# Patient Record
Sex: MALE | URBAN - METROPOLITAN AREA
[De-identification: names, ages, dates, MRNs, and addresses within clinical notes are randomized per-mention and may not be internally consistent; named-entity substitution may affect disease eponyms.]

---

## 2023-08-11 ENCOUNTER — EMERGENCY (EMERGENCY)
Facility: HOSPITAL | Age: 63
LOS: 1 days | Discharge: HOME | End: 2023-08-11
Attending: EMERGENCY MEDICINE | Admitting: EMERGENCY MEDICINE
Payer: COMMERCIAL

## 2023-08-11 VITALS
OXYGEN SATURATION: 97 % | HEART RATE: 86 BPM | DIASTOLIC BLOOD PRESSURE: 88 MMHG | SYSTOLIC BLOOD PRESSURE: 156 MMHG | RESPIRATION RATE: 16 BRPM | TEMPERATURE: 99 F

## 2023-08-11 VITALS
RESPIRATION RATE: 16 BRPM | HEIGHT: 70 IN | TEMPERATURE: 99 F | OXYGEN SATURATION: 97 % | DIASTOLIC BLOOD PRESSURE: 105 MMHG | WEIGHT: 315 LBS | HEART RATE: 81 BPM | SYSTOLIC BLOOD PRESSURE: 169 MMHG

## 2023-08-11 PROCEDURE — 73700 CT LOWER EXTREMITY W/O DYE: CPT | Mod: 26,LT,MA

## 2023-08-11 PROCEDURE — 73562 X-RAY EXAM OF KNEE 3: CPT | Mod: 26,LT

## 2023-08-11 PROCEDURE — 99284 EMERGENCY DEPT VISIT MOD MDM: CPT | Mod: 25

## 2023-08-11 PROCEDURE — 76376 3D RENDER W/INTRP POSTPROCES: CPT | Mod: 26

## 2023-08-11 PROCEDURE — 99284 EMERGENCY DEPT VISIT MOD MDM: CPT

## 2023-08-11 PROCEDURE — 73700 CT LOWER EXTREMITY W/O DYE: CPT | Mod: MA

## 2023-08-11 PROCEDURE — 73562 X-RAY EXAM OF KNEE 3: CPT

## 2023-08-11 PROCEDURE — 76376 3D RENDER W/INTRP POSTPROCES: CPT

## 2023-08-11 RX ORDER — OXYCODONE AND ACETAMINOPHEN 5; 325 MG/1; MG/1
1 TABLET ORAL ONCE
Refills: 0 | Status: DISCONTINUED | OUTPATIENT
Start: 2023-08-11 | End: 2023-08-11

## 2023-08-11 RX ORDER — ACETAMINOPHEN 500 MG
650 TABLET ORAL ONCE
Refills: 0 | Status: COMPLETED | OUTPATIENT
Start: 2023-08-11 | End: 2023-08-11

## 2023-08-11 RX ADMIN — OXYCODONE AND ACETAMINOPHEN 1 TABLET(S): 5; 325 TABLET ORAL at 21:40

## 2023-08-11 RX ADMIN — OXYCODONE AND ACETAMINOPHEN 1 TABLET(S): 5; 325 TABLET ORAL at 21:22

## 2023-08-11 RX ADMIN — Medication 650 MILLIGRAM(S): at 21:21

## 2023-08-11 RX ADMIN — Medication 650 MILLIGRAM(S): at 21:40

## 2023-08-11 NOTE — ED ADULT TRIAGE NOTE - BSA (M2)
2.65 [Grade:  _____] : Grade: [unfilled] [Sexually Active] : not sexually active [Secondhand Smoke] : no exposure to  secondhand smoke

## 2023-08-11 NOTE — ED PROVIDER NOTE - ATTENDING APP SHARED VISIT CONTRIBUTION OF CARE
I have seen the patient with the BRITTON and agree with above examination and assessment and plan with the following addendum:        Focused PE:   General: NAD, alert and oriented  Head: Normocephalic, atraumatic  Eyes: PERRLA, EOMI  Cardiac: RRR, no murmurs, rubs or gallops  Resp: CTA, no wheezes, rales or ronchi  GI: Nondistended, nontender, no rebound or guarding  MSK: L. knee has difficulty moving, has swelling. Tender  Neuro: Alert and oriented, no focal deficits.   Ext: Non edematous, nontender.    Ddx: fracture/ tendon injury/ meniscus injury  Plan: xray, pain control, ct, reassess

## 2023-08-11 NOTE — ED ADULT NURSE NOTE - NSFALLHARMRISKINTERV_ED_ALL_ED
Assistance OOB with selected safe patient handling equipment if applicable/Assistance with ambulation/Communicate risk of Fall with Harm to all staff, patient, and family/Monitor gait and stability/Provide patient with walking aids/Provide visual cue: red socks, yellow wristband, yellow gown, etc/Reinforce activity limits and safety measures with patient and family/Bed in lowest position, wheels locked, appropriate side rails in place/Call bell, personal items and telephone in reach/Instruct patient to call for assistance before getting out of bed/chair/stretcher/Non-slip footwear applied when patient is off stretcher/[place] to call system/Physically safe environment - no spills, clutter or unnecessary equipment/Purposeful Proactive Rounding/Room/bathroom lighting operational, light cord in reach Assistance OOB with selected safe patient handling equipment if applicable/Assistance with ambulation/Communicate risk of Fall with Harm to all staff, patient, and family/Monitor gait and stability/Provide patient with walking aids/Provide visual cue: red socks, yellow wristband, yellow gown, etc/Reinforce activity limits and safety measures with patient and family/Bed in lowest position, wheels locked, appropriate side rails in place/Call bell, personal items and telephone in reach/Instruct patient to call for assistance before getting out of bed/chair/stretcher/Non-slip footwear applied when patient is off stretcher/[place] to call system/Physically safe environment - no spills, clutter or unnecessary equipment/Purposeful Proactive Rounding/Room/bathroom lighting operational, light cord in reach Assistance OOB with selected safe patient handling equipment if applicable/Assistance with ambulation/Communicate risk of Fall with Harm to all staff, patient, and family/Monitor gait and stability/Provide patient with walking aids/Provide visual cue: red socks, yellow wristband, yellow gown, etc/Reinforce activity limits and safety measures with patient and family/Bed in lowest position, wheels locked, appropriate side rails in place/Call bell, personal items and telephone in reach/Instruct patient to call for assistance before getting out of bed/chair/stretcher/Non-slip footwear applied when patient is off stretcher/[place] to call system/Physically safe environment - no spills, clutter or unnecessary equipment/Purposeful Proactive Rounding/Room/bathroom lighting operational, light cord in reach

## 2023-08-11 NOTE — ED PROVIDER NOTE - PATIENT PORTAL LINK FT
You can access the FollowMyHealth Patient Portal offered by [facility] by registering at the following website: [email or web address]. By joining [employer]’s FollowMyHealth portal, you will also be able to view your health information using other applications (apps) compatible with our system. You can access the FollowMyHealth Patient Portal offered by [facility] by registering at the following website: [email or web address]. By joining [employer]’s FollowMyHealth portal, you will also be able to view your health information using other applications (apps) compatible with our system. You can access the FollowMyHealth Patient Portal offered by [facility] by registering at the following website: [email or web address]. By joining [employer]’s FollowMyHealth portal, you will also be able to view your health information using other applications (apps) compatible with our system.

## 2023-08-11 NOTE — ED ADULT NURSE NOTE - IS THE PATIENT ABLE TO BE SCREENED?
Called pt and left voice message for pt to contact office for message for YRIS Hill.
Please review and assist.
Yes

## 2023-08-11 NOTE — ED PROVIDER NOTE - NSFOLLOWUPINSTRUCTIONS_ED_ALL_ED_FT
1) Follow-up with Orthopedics, See referred doctor. Call today/next business day for close, prompt follow-up.  2) Return to Emergency room for any worsening or persistent pain, weakness, numbness, fever, color change to extremity, or any other concerning symptoms.  3) Take ibuprofen 600 mg every  6 hours as needed.   4) You can consider discussing with your doctor if physical therapy or further imaging as an MRI may be beneficial.

## 2023-08-11 NOTE — ED PROVIDER NOTE - NS ED ATTENDING STATEMENT MOD
This was a shared visit with the BRITTON. I reviewed and verified the documentation and independently performed the documented:

## 2023-08-11 NOTE — ED ADULT NURSE REASSESSMENT NOTE - NS ED NURSE REASSESS COMMENT FT1
Knee immobilizer placed to pt LLE, pt instructed on how to use crutches and demonstrated proper usage.  Pt to dc home and follow up out pt with ortho.  Waiting for transportation.  Pt expressed understanding of dc instructions.

## 2023-08-11 NOTE — ED ADULT TRIAGE NOTE - NS ED NURSE AMBULANCES
no [facility] [place] [facility]

## 2023-08-11 NOTE — ED PROVIDER NOTE - OBJECTIVE STATEMENT
62-year-old male without reported past medical history presents today due to a trip and fall.  Patient reports that he landed on his left knee.  Patient reports he is not able to put weight onto the knee since the injury.  Patient describes the pain as aching, nonradiating, slightly improved since being in the ED, and currently 7 out of 10.  Patient denies head injury, numbness, weakness, laceration, prodromal symptoms, syncope, or any other complaints.

## 2023-08-11 NOTE — ED ADULT NURSE NOTE - OBJECTIVE STATEMENT
Pt presented to ED c/o left knee pain s/p falling on steps.  Pt states all his weight ended up on his left knee when he fell.  Denies any chest pain or SOB.  No n/v/d.  Some swelling and redness noted to left knee area.  Maintain comfort and safety.

## 2023-08-11 NOTE — ED PROVIDER NOTE - PROVIDER TOKENS
PROVIDER:[TOKEN:[[id number]],FOLLOWUP:[1-3 Days]] PROVIDER:[TOKEN:[[id number]],FOLLOWUP:[1-3 Days]] PROVIDER:[TOKEN:[[id number]],FOLLOWUP:[1-3 Days]]

## 2023-08-11 NOTE — ED PROVIDER NOTE - CARE PROVIDER_API CALL
Reid Morrison.  Orthopaedic Surgery  [address], [address]  [place] [address]  Phone: [phone]  Fax: [phone]  Follow Up Time: 1-3 Days   Reid Morrison.  Orthopaedic Surgery  [address], [address]  [place] [address]  Phone: [phone]  Fax: [phone]  Follow Up Time: 1-3 Days   Reid Morrison.  Orthopaedic Surgery  [address], [address]  [place] [address]  Phone: [phone]  Fax: [phone]  Follow Up Time: 1-3 Days

## 2023-08-11 NOTE — ED PROVIDER NOTE - PHYSICAL EXAMINATION
Constitutional: Awake, Alert, non-toxic. NAD. Well appearing, well nourished.   HEAD: Normocephalic, atraumatic.   EYES: EOM intact, conjunctiva and sclera are clear bilaterally.   ENT: No rhinorrhea, patent, mucous membranes pink/moist, no drooling or stridor.   NECK: Supple, non-tender  RESPIRATORY: Normal respiratory effort  EXTREMITIES: Full passive and active ROM in all extremities; (+) left lateral knee abrasion, (+) left quadricept tendon swelling and TTP, limited left knee extension, hips non-tender to palpation; distal pulses palpable and symmetric  SKIN: Warm, dry; good skin turgor, no apparent lesions or rashes, no ecchymosis, brisk capillary refill.  NEURO: A&O x3. Sensory and motor functions are grossly intact. Speech is normal. Appearance and judgement seem appropriate for gender and age.

## 2023-08-16 PROBLEM — Z00.00 ENCOUNTER FOR PREVENTIVE HEALTH EXAMINATION: Status: ACTIVE | Noted: 2023-08-16

## 2023-08-17 ENCOUNTER — APPOINTMENT (OUTPATIENT)
Dept: ORTHOPEDIC SURGERY | Facility: CLINIC | Age: 63
End: 2023-08-17
Payer: COMMERCIAL

## 2023-08-17 DIAGNOSIS — S89.92XA UNSPECIFIED INJURY OF LEFT LOWER LEG, INITIAL ENCOUNTER: ICD-10-CM
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
## 2023-08-17 DIAGNOSIS — S79.922A UNSPECIFIED INJURY OF LEFT THIGH, INITIAL ENCOUNTER: ICD-10-CM

## 2023-08-17 DIAGNOSIS — T14.8XXA OTHER INJURY OF UNSPECIFIED BODY REGION, INITIAL ENCOUNTER: ICD-10-CM

## 2023-08-17 DIAGNOSIS — M25.469 EFFUSION, UNSPECIFIED KNEE: ICD-10-CM

## 2023-08-17 PROCEDURE — 99204 OFFICE O/P NEW MOD 45 MIN: CPT

## 2023-08-17 NOTE — DATA REVIEWED
[FreeTextEntry1] : xrays and CT scans of the left [facility] I reviewed the study personally and agree with the following interpretation with the following exception- subpar visualization of the distal quad tendon):  xrays:  IMPRESSION: No radiographic osseous pathology. CT: Osseous: No acute displaced fracture or dislocation. 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 Mineralization within normal limits.  Soft tissues: Ill-defined, grossly lentiform shaped hyperattenuating prepatellar superficial subcutaneous hematoma measuring approximately 13.0 x 6.2 cm in maximal axial and longitudinal dimensions. Chronic grade 2 strain at the distal myotendinous junction of the vastus lateralis. Quadriceps tendinosis without rupture or gross high-grade tear. Tendinosis and mild laxity of the patellar tendon without gross tear. Of the Small-volume suprapatellar effusion. No distended Baker's popliteal cyst. Muscle bulk and attenuation otherwise grossly within normal limits.  IMPRESSION: 1. No acute displaced fracture of the left knee. Moderate volume prepatellar superficial subcutaneous hematoma. 2. Tendinosis and mild laxity of the extensor mechanism of the knee without rupture or gross high-grade tear. --- End of Report ---

## 2023-08-17 NOTE — DISCUSSION/SUMMARY
[de-identified] : The patient and their family member(s) were advised of the diagnosis. The natural history of the pathology was explained in full to the patient and the family in layman's terms.  quad strain-possible tear Burch Tanna vs Bursitis vs hematoma possible intrinsic knee injury that requires further examination Here is the plan that we have set forth today. 1. More ROM and movement when possible; ice and elevation- want to prevent stasis in the calf as well- exercises reviewed 2. aleve as needed but stick to two tabs in the morning, one at bedtime 3. MRI in the next 48hours to assist with soft tissue visualization in the distal quad but also the knee that is very difficult to examine today 4. return in 1 weeks to reaasess skin and swelling if any issues call sooner. The patient and the family understands the plan of care as described above.  All questions have been answered. Thank you for allowing me to care for [place]ED[place]. Sincerely, Kelly Doty, DO, FAAP, CAQ-[facility]

## 2023-08-17 NOTE — HISTORY OF PRESENT ILLNESS
[de-identified] : DOI: 8/11/23 Mechanism: was walking down a set of stairs.  RIght foot slipped first causing him to land with all his weight on the left leg. ended up on the ground but no hip pain or head trauma. Had to call 911 to be amulenced to the hospital- as he was unable to get up.  Taken to [facility]. He had immediate pain in the knee and thigh.  Swelling ensued and over the first few days developed quite a bit of ecchymosis. Taking Aleve GIven percocet for the first night. He is wearing a knee immobilizer and using a cane. 5'10" 350lbs Reports no medical issues. No metal in his body Has been able to put some weigh through the leg lately- now that he was reassured by imaging done at the hospital.  leg is down with gravity most of the day Struggling to get in or out of a car.  allergies; none  Review of Systems:  Constitutional:  no fever, fatigue or recent weight loss  HEENT: negative  CV: negative  Pulm: negative  GI: negative  : negative  Neuro: negative  Skin: negative  Endocrine: negative  Heme: negative  MSK: See HPI.

## 2023-08-17 NOTE — IMAGING
[de-identified] : Constitutional: Healthy, and well nourished in no acute distress.  Psych: Calm, cooperative, grossly normal  Eyes: Normal, sclera non-icteric  Ears, Nose, Mouth, Throat: External inspection of nose and ears does not reveal any scars or masses  Head: Normocephalic  Neck: Neck appears supple without sign of limited or painful ROM  Respiratory: Normal effort, no respiratory distress, no cyanosis  Cardiovascular: Visualized extremities without edema or varicosities, warm, brisk cap refill  Abdominal/GI: Not examined  Skin: No rashes on the extremity examined. Morbid obesity, walking with a cane and knee immobilizer on high. Neurological: Patient is awake and alert    due to patient discomfort- examination occurred with him seated in a chair. Some assessment standing Knee ROM	                	                  	  	 LEFT	               EXTENSION: Neutral (passively) - (unable to actively get to neutral) FLEXION: 90 degs seated.  Exam of the LEFT thigh/ Knee: Soft tissues: moderate ecchymosis  to the anterior and medial thigh- mid to distal 1/3rd.  There is also a well localized collect pre to suprapatellar- appears minimally warm- no spreading erythema but appears localized- possible Marilyn Tanna vs traumatic bursitis vs hematoma. skin overlying the collection is mobile - not taut and no chaz fluctuance. swelling above and intra-articular Tenderness: globally minimal tenderness to the anterior and peripatellar region plus some diffuse tenderness to the thigh- namely distal 1/3rd No calf tenderness on exam Negative susanna's Can plantarflexiona dn dorsiflex well Normal sensation to leg.  Special tests:limited exam abiliies todayu  Knee stability:  unable to assess today in the chair 	 Patella: unable to palpate paellar difinitively  due to large effusion and body habitus   WB with cane but can stand and distribute weight. Can contract quad briefly isometrically Can flex hip and flex knee up to about 40 degrees at the hip modified log roll into IR/ER nontender

## 2023-08-18 ENCOUNTER — APPOINTMENT (OUTPATIENT)
Dept: MRI IMAGING | Facility: CLINIC | Age: 63
End: 2023-08-18
Payer: COMMERCIAL

## 2023-08-18 PROCEDURE — 73721 MRI JNT OF LWR EXTRE W/O DYE: CPT | Mod: LT

## 2023-08-21 ENCOUNTER — APPOINTMENT (OUTPATIENT)
Dept: ORTHOPEDIC SURGERY | Facility: CLINIC | Age: 63
End: 2023-08-21
Payer: COMMERCIAL

## 2023-08-21 PROCEDURE — 99204 OFFICE O/P NEW MOD 45 MIN: CPT | Mod: 57

## 2023-08-21 NOTE — IMAGING
[de-identified] : The patient is a well appearing 62 year year old male of their stated age. Patient ambulates with a antalagic gait. Negative straight leg raise bilateral  General: in no acute distress, seated comfortably, moving easily. Obesity  Skin: No discoloration, rashes; on palpation skin is dry,  Neuro: Normal sensation all dermatomes, motor all myotomes Vascular: Normal pulses, no edema, normal temperature Coordination and balance: Normal Psych: normal mood and affect, non pressured speech, alert and oriented x3  Effected Knee:                         	 ROM:  3-140 degrees  Lachman: Negative Pivot Shift: Negative Anterior Drawer: Negative Posterior Drawer / Sag: Negative Varus Stress 0 degrees: Stable Varus Stress 30 degrees: Stable Valgus Stress 0 degrees: Stable Valgus Stress 30 degrees: Stable Medial Angel: Negative Lateral Angel: Negative Patella Glide: 2+ Patella Apprehension: Negative Patella Grind: Negative  Palpation: Medial Joint Line: Nontender Lateral Joint Line: Nontender Medial Collateral Ligament: Nontender Lateral Collateral Ligament/PLC: Nontender Distal Femur: Nontender Proximal Tibia: Nontender Tibial Tubercle: Nontender Distal Pole Patella: Nontender Quadriceps Tendon: tender &  not Intact Patella Tendon: Nontender &  Intact Medial Distal Hamstring/PES: Nontender Lateral Distal Hamstring: Nontender & Stable Iliotibial Band: Nontender Medial Patellofemoral Ligament: Nontender Adductor: Nontender Proximal GSC-Plantaris: Nontender Calf: Supple & Nontender  Inspection: Deformity: yes Erythema: No Ecchymosis: No Abrasions: No Effusion: yes Prepatellar Bursitis: No  Neurologic Exam: Sensation L4-S1: Grossly Intact  Motor Exam: Quadriceps: 5 out of 5 Hamstrings: 5 out of 5 EHL: 5 out of 5 FHL: 5 out of 5 TA: 5 out of 5 GS: 5 out of 5  Circulatory/Pulses: Dorsalis Pedis: 2+ Posterior Tibialis: 2+  Additional Pertinent Findings: None  Contralateral Knee:                           	 ROM: 0-145 degrees  Other Pertinent Findings: None

## 2023-08-23 ENCOUNTER — OUTPATIENT (OUTPATIENT)
Dept: OUTPATIENT SERVICES | Facility: HOSPITAL | Age: 63
LOS: 1 days | End: 2023-08-23
Payer: COMMERCIAL

## 2023-08-23 VITALS
SYSTOLIC BLOOD PRESSURE: 133 MMHG | HEART RATE: 75 BPM | HEIGHT: 69.5 IN | WEIGHT: 315 LBS | OXYGEN SATURATION: 96 % | TEMPERATURE: 97 F | DIASTOLIC BLOOD PRESSURE: 87 MMHG | RESPIRATION RATE: 15 BRPM

## 2023-08-23 DIAGNOSIS — Z01.818 ENCOUNTER FOR OTHER PREPROCEDURAL EXAMINATION: ICD-10-CM

## 2023-08-23 DIAGNOSIS — E66.01 MORBID (SEVERE) OBESITY DUE TO EXCESS CALORIES: ICD-10-CM

## 2023-08-23 DIAGNOSIS — S76.112A STRAIN OF LEFT QUADRICEPS MUSCLE, FASCIA AND TENDON, INITIAL ENCOUNTER: ICD-10-CM

## 2023-08-23 DIAGNOSIS — G47.30 SLEEP APNEA, UNSPECIFIED: ICD-10-CM

## 2023-08-23 LAB
A1C WITH ESTIMATED AVERAGE GLUCOSE RESULT: 5.2 % — SIGNIFICANT CHANGE UP (ref 4–5.6)
ALBUMIN SERPL ELPH-MCNC: 3.8 G/DL — SIGNIFICANT CHANGE UP (ref 3.3–5)
ALP SERPL-CCNC: 58 U/L — SIGNIFICANT CHANGE UP (ref 30–120)
ALT FLD-CCNC: 55 U/L — SIGNIFICANT CHANGE UP (ref 10–60)
ANION GAP SERPL CALC-SCNC: 7 MMOL/L — SIGNIFICANT CHANGE UP (ref 5–17)
AST SERPL-CCNC: 32 U/L — SIGNIFICANT CHANGE UP (ref 10–40)
BILIRUB SERPL-MCNC: 0.9 MG/DL — SIGNIFICANT CHANGE UP (ref 0.2–1.2)
BUN SERPL-MCNC: 21 MG/DL — SIGNIFICANT CHANGE UP (ref 7–23)
CALCIUM SERPL-MCNC: 8.9 MG/DL — SIGNIFICANT CHANGE UP (ref 8.4–10.5)
CHLORIDE SERPL-SCNC: 102 MMOL/L — SIGNIFICANT CHANGE UP (ref 96–108)
CO2 SERPL-SCNC: 28 MMOL/L — SIGNIFICANT CHANGE UP (ref 22–31)
CREAT SERPL-MCNC: 0.91 MG/DL — SIGNIFICANT CHANGE UP (ref 0.5–1.3)
EGFR: 96 ML/MIN/1.73M2 — SIGNIFICANT CHANGE UP
ESTIMATED AVERAGE GLUCOSE: 103 MG/DL — SIGNIFICANT CHANGE UP (ref 68–114)
GLUCOSE SERPL-MCNC: 108 MG/DL — HIGH (ref 70–99)
HCT VFR BLD CALC: 45.6 % — SIGNIFICANT CHANGE UP (ref 39–50)
HGB BLD-MCNC: 14.9 G/DL — SIGNIFICANT CHANGE UP (ref 13–17)
MCHC RBC-ENTMCNC: 29.9 PG — SIGNIFICANT CHANGE UP (ref 27–34)
MCHC RBC-ENTMCNC: 32.7 GM/DL — SIGNIFICANT CHANGE UP (ref 32–36)
MCV RBC AUTO: 91.4 FL — SIGNIFICANT CHANGE UP (ref 80–100)
NRBC # BLD: 0 /100 WBCS — SIGNIFICANT CHANGE UP (ref 0–0)
PLATELET # BLD AUTO: 236 K/UL — SIGNIFICANT CHANGE UP (ref 150–400)
POTASSIUM SERPL-MCNC: 4.7 MMOL/L — SIGNIFICANT CHANGE UP (ref 3.5–5.3)
POTASSIUM SERPL-SCNC: 4.7 MMOL/L — SIGNIFICANT CHANGE UP (ref 3.5–5.3)
PROT SERPL-MCNC: 7.5 G/DL — SIGNIFICANT CHANGE UP (ref 6–8.3)
RBC # BLD: 4.99 M/UL — SIGNIFICANT CHANGE UP (ref 4.2–5.8)
RBC # FLD: 12.5 % — SIGNIFICANT CHANGE UP (ref 10.3–14.5)
SODIUM SERPL-SCNC: 137 MMOL/L — SIGNIFICANT CHANGE UP (ref 135–145)
WBC # BLD: 6.44 K/UL — SIGNIFICANT CHANGE UP (ref 3.8–10.5)
WBC # FLD AUTO: 6.44 K/UL — SIGNIFICANT CHANGE UP (ref 3.8–10.5)

## 2023-08-23 PROCEDURE — G0463: CPT

## 2023-08-23 PROCEDURE — 93005 ELECTROCARDIOGRAM TRACING: CPT

## 2023-08-23 PROCEDURE — 85027 COMPLETE CBC AUTOMATED: CPT

## 2023-08-23 PROCEDURE — 93010 ELECTROCARDIOGRAM REPORT: CPT

## 2023-08-23 PROCEDURE — 80053 COMPREHEN METABOLIC PANEL: CPT

## 2023-08-23 PROCEDURE — 36415 COLL VENOUS BLD VENIPUNCTURE: CPT

## 2023-08-23 PROCEDURE — 83036 HEMOGLOBIN GLYCOSYLATED A1C: CPT

## 2023-08-23 NOTE — H&P PST ADULT - ASSESSMENT
this is a 63 y/o male who is scheduled for left quadriceps tendon repair on 8/29/23 this is a 61 y/o male who is scheduled for left quadriceps tendon repair on 8/29/23

## 2023-08-23 NOTE — H&P PST ADULT - NSICDXFAMILYHX_GEN_ALL_CORE_FT
FAMILY HISTORY:  Father  Still living? No  FH: arrhythmia, Age at diagnosis: Age Unknown    Mother  Still living? Yes, Estimated age:   FHx: hypertension, Age at diagnosis: Age Unknown

## 2023-08-23 NOTE — H&P PST ADULT - NSCAFFEAMTFREQ_GEN_ALL_CORE_SD
I have personally evaluated and examined the patient. The Attending was available to me as a supervising provider if needed. 5-6 cups/cans per day

## 2023-08-23 NOTE — H&P PST ADULT - HISTORY OF PRESENT ILLNESS
this is  a 62 yr old male who fell 2 weeks ago and tore left quadriceps tendon; has had alot of left knee pain and swelling since then; wearing a leg brace; to have left quadriceps tendon repair

## 2023-08-24 ENCOUNTER — APPOINTMENT (OUTPATIENT)
Dept: ORTHOPEDIC SURGERY | Facility: CLINIC | Age: 63
End: 2023-08-24

## 2023-08-25 NOTE — DISCUSSION/SUMMARY
[de-identified] : Assessment:   The patient presents with history, examination and imaging that are most consistent with a diagnosis of:  Left quadriceps rupture.    We discussed further treatment options, including continuing conservative measures versus surgical intervention. The patient feels they have exhausted conservative treatment and is seeking a definitive treatment option. The risks, benefits and alternatives of the proposed procedure were discussed, along with the expected post-operative recovery and potential complications. The patient agrees to participate in post-operative physical therapy. The patient verbalized understanding and wishes to proceed with: LEFT QUADRICEPS TENDON REPAIR   Prior to appointment and during encounter with patient extensive medical records were reviewed including but not limited to, hospital records, out patient records, imaging results, and lab data. During this appointment the patient was examined, diagnoses were discussed and explained in a face to face manner. In addition extensive time was spent reviewing aforementioned diagnostic studies. Counseling including abnormal image results, differential diagnoses, treatment options, risk and benefits, lifestyle changes, current condition, and current medications was performed. Patient's comments, questions, and concerns were addressed with spouse present, and patient verbalized understanding.   Plan:   Counseling:     - Patient recommended to modify their activities until symptoms resolve.     - Resume use of cane and knee brace for stability.   Surgery:     - Surgical case request placed for left quadriceps repair.    - Planned procedure as specified in assessment above.     - Patient to schedule at their earliest convenience.     Follow-up:  post operatively   - Patient will require preop medical clearance - will schedule surgery asap  Consent:  Conservative treatment, nontreatment, nonsurgical intervention and surgical intervention treatment options have been reviewed with the patient.  The patient continues to be symptomatic and has failed conservative treatment, and elects to move forward with surgical intervention.  The patient is indicated for [the above procedure] and all indicated procedures. As such the alternatives, benefits and risks, of the above procedure, including but not limited to bleeding, infection, neurovascular injury, loss of limb, loss of life,  DVT, PE, RSD, inability to return to previous level of activity, inability to return to previous level of employment, advancement of or to osteoarthritic changes, joint instability or motion loss, hardware failure or migration, malunion or nonunion, failure to resolve all symptoms, failure to return to sports and need for further procedures were discussed with the patient and/or their legal guardian who agreed to move forward with surgical intervention.  They have reviewed and signed the consent form today after expressing understanding of the above documented conversation. The patient or their representative will contact my office as instructed on the preoperative instruction sheet they received today to schedule surgery in a timely manner as discussed.

## 2023-08-25 NOTE — HISTORY OF PRESENT ILLNESS
[de-identified] : The patient is a 62 year old M who presents today complaining of left leg pain. Date of Injury/Onset:  8/11/23 Pain:    At Rest: 1/10  With Activity:    7/10  Mechanism of injury: Slipped down the stairs and felt a strain. Quality of symptoms: Aching Improves with: Aleve Worse with: Walking / Bending Prior treatment/ Imaging: [facility] date of injury/ Followed up with Dr. Doty/ MRI  Occupation:   Additional Information: [None]   The patient is a 62-year-old who presents today complaining of left knee pain after slip and fall downstairs on 8/11/2023. He was transported to [facility] and evalauated with xrays, CT scan and bracing.

## 2023-08-25 NOTE — DATA REVIEWED
[FreeTextEntry1] : [facility] MRI Left knee 2/18/2023 Left quadriceps rupture. .   CT scan left knee NW IMPRESSION: No radiographic osseous pathology. CT: Osseous: No acute displaced fracture or dislocation. 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 Mineralization within normal limits.  Soft tissues: Ill-defined, grossly lentiform shaped hyperattenuating prepatellar superficial subcutaneous hematoma measuring approximately 13.0 x 6.2 cm in maximal axial and longitudinal dimensions. Chronic grade 2 strain at the distal myotendinous junction of the vastus lateralis. Quadriceps tendinosis without rupture or gross high-grade tear. Tendinosis and mild laxity of the patellar tendon without gross tear. Of the Small-volume suprapatellar effusion. No distended Baker's popliteal cyst. Muscle bulk and attenuation otherwise grossly within normal limits.  IMPRESSION: 1. No acute displaced fracture of the left knee. Moderate volume prepatellar superficial subcutaneous hematoma. 2. Tendinosis and mild laxity of the extensor mechanism of the knee without rupture or gross high-grade tear. --- End of Report ---.

## 2023-08-25 NOTE — PHYSICAL EXAM
[Normal Coordination] : normal coordination [Normal DTR UE/LE] : normal DTR UE/LE  [Normal Sensation] : normal sensation [Normal Mood and Affect] : normal mood and affect [Orientated] : orientated [Normal Skin] : normal skin [No Rash] : no rash [No Ulcers] : no ulcers [No Lesions] : no lesions [No obvious lymphadenopathy in areas examined] : no obvious lymphadenopathy in areas examined [] : ambulation with cane [Left] : left knee [There are no fractures, subluxations or dislocations. No significant abnormalities are seen] : There are no fractures, subluxations or dislocations. No significant abnormalities are seen [Degenerative change] : Degenerative change [Mild patellofemoral OA] : Mild patellofemoral OA [FreeTextEntry9] : PATELLA BAJA

## 2023-08-28 ENCOUNTER — TRANSCRIPTION ENCOUNTER (OUTPATIENT)
Age: 63
End: 2023-08-28

## 2023-08-28 RX ORDER — CEFAZOLIN SODIUM 1 G
2000 VIAL (EA) INJECTION ONCE
Refills: 0 | Status: DISCONTINUED | OUTPATIENT
Start: 2023-08-29 | End: 2023-08-29

## 2023-08-28 RX ORDER — CHLORHEXIDINE GLUCONATE 213 G/1000ML
1 SOLUTION TOPICAL DAILY
Refills: 0 | Status: DISCONTINUED | OUTPATIENT
Start: 2023-08-29 | End: 2023-09-02

## 2023-08-28 RX ORDER — APREPITANT 80 MG/1
40 CAPSULE ORAL ONCE
Refills: 0 | Status: COMPLETED | OUTPATIENT
Start: 2023-08-29 | End: 2023-08-29

## 2023-08-29 ENCOUNTER — APPOINTMENT (OUTPATIENT)
Dept: ORTHOPEDIC SURGERY | Facility: HOSPITAL | Age: 63
End: 2023-08-29
Payer: COMMERCIAL

## 2023-08-29 ENCOUNTER — TRANSCRIPTION ENCOUNTER (OUTPATIENT)
Age: 63
End: 2023-08-29

## 2023-08-29 ENCOUNTER — INPATIENT (INPATIENT)
Facility: HOSPITAL | Age: 63
LOS: 3 days | Discharge: NURSING, REHAB OR LONG-TERM CARE | DRG: 488 | End: 2023-09-02
Attending: STUDENT IN AN ORGANIZED HEALTH CARE EDUCATION/TRAINING PROGRAM | Admitting: STUDENT IN AN ORGANIZED HEALTH CARE EDUCATION/TRAINING PROGRAM
Payer: COMMERCIAL

## 2023-08-29 VITALS
HEIGHT: 70 IN | OXYGEN SATURATION: 97 % | TEMPERATURE: 97 F | SYSTOLIC BLOOD PRESSURE: 142 MMHG | RESPIRATION RATE: 18 BRPM | HEART RATE: 73 BPM | DIASTOLIC BLOOD PRESSURE: 91 MMHG | WEIGHT: 315 LBS

## 2023-08-29 DIAGNOSIS — S76.112A STRAIN OF LEFT QUADRICEPS MUSCLE, FASCIA AND TENDON, INITIAL ENCOUNTER: ICD-10-CM

## 2023-08-29 PROCEDURE — 27385 REPAIR OF THIGH MUSCLE: CPT | Mod: LT

## 2023-08-29 PROCEDURE — 27405 REPAIR OF KNEE LIGAMENT: CPT | Mod: 59,LT

## 2023-08-29 PROCEDURE — 29345 APPLICATION OF LONG LEG CAST: CPT | Mod: 59,LT

## 2023-08-29 DEVICE — SUT ANCHOR FIBERTAK TRIPLE LOAD TAPE BL/W BLK/W W: Type: IMPLANTABLE DEVICE | Status: FUNCTIONAL

## 2023-08-29 RX ORDER — HYDROMORPHONE HYDROCHLORIDE 2 MG/ML
0.5 INJECTION INTRAMUSCULAR; INTRAVENOUS; SUBCUTANEOUS
Refills: 0 | Status: DISCONTINUED | OUTPATIENT
Start: 2023-08-29 | End: 2023-08-29

## 2023-08-29 RX ORDER — ACETAMINOPHEN 500 MG
1000 TABLET ORAL ONCE
Refills: 0 | Status: COMPLETED | OUTPATIENT
Start: 2023-08-29 | End: 2023-08-29

## 2023-08-29 RX ORDER — CEFAZOLIN SODIUM 1 G
2000 VIAL (EA) INJECTION EVERY 8 HOURS
Refills: 0 | Status: COMPLETED | OUTPATIENT
Start: 2023-08-29 | End: 2023-08-30

## 2023-08-29 RX ORDER — ACETAMINOPHEN 500 MG
1000 TABLET ORAL EVERY 8 HOURS
Refills: 0 | Status: DISCONTINUED | OUTPATIENT
Start: 2023-08-30 | End: 2023-09-02

## 2023-08-29 RX ORDER — MAGNESIUM HYDROXIDE 400 MG/1
30 TABLET, CHEWABLE ORAL DAILY
Refills: 0 | Status: DISCONTINUED | OUTPATIENT
Start: 2023-08-29 | End: 2023-09-02

## 2023-08-29 RX ORDER — HYDROMORPHONE HYDROCHLORIDE 2 MG/ML
0.5 INJECTION INTRAMUSCULAR; INTRAVENOUS; SUBCUTANEOUS
Refills: 0 | Status: DISCONTINUED | OUTPATIENT
Start: 2023-08-29 | End: 2023-09-02

## 2023-08-29 RX ORDER — SODIUM CHLORIDE 9 MG/ML
1000 INJECTION, SOLUTION INTRAVENOUS
Refills: 0 | Status: DISCONTINUED | OUTPATIENT
Start: 2023-08-29 | End: 2023-08-29

## 2023-08-29 RX ORDER — ONDANSETRON 8 MG/1
4 TABLET, FILM COATED ORAL ONCE
Refills: 0 | Status: DISCONTINUED | OUTPATIENT
Start: 2023-08-29 | End: 2023-08-29

## 2023-08-29 RX ORDER — SODIUM CHLORIDE 9 MG/ML
1000 INJECTION, SOLUTION INTRAVENOUS
Refills: 0 | Status: DISCONTINUED | OUTPATIENT
Start: 2023-08-29 | End: 2023-09-02

## 2023-08-29 RX ORDER — HYDROMORPHONE HYDROCHLORIDE 2 MG/ML
1 INJECTION INTRAMUSCULAR; INTRAVENOUS; SUBCUTANEOUS
Refills: 0 | Status: DISCONTINUED | OUTPATIENT
Start: 2023-08-29 | End: 2023-08-29

## 2023-08-29 RX ORDER — OXYCODONE HYDROCHLORIDE 5 MG/1
5 TABLET ORAL EVERY 4 HOURS
Refills: 0 | Status: DISCONTINUED | OUTPATIENT
Start: 2023-08-29 | End: 2023-09-02

## 2023-08-29 RX ORDER — OXYCODONE HYDROCHLORIDE 5 MG/1
10 TABLET ORAL EVERY 4 HOURS
Refills: 0 | Status: DISCONTINUED | OUTPATIENT
Start: 2023-08-29 | End: 2023-09-02

## 2023-08-29 RX ORDER — ONDANSETRON 8 MG/1
4 TABLET, FILM COATED ORAL EVERY 6 HOURS
Refills: 0 | Status: DISCONTINUED | OUTPATIENT
Start: 2023-08-29 | End: 2023-09-02

## 2023-08-29 RX ADMIN — Medication 100 MILLIGRAM(S): at 22:20

## 2023-08-29 RX ADMIN — SODIUM CHLORIDE 125 MILLILITER(S): 9 INJECTION, SOLUTION INTRAVENOUS at 22:22

## 2023-08-29 RX ADMIN — Medication 1000 MILLIGRAM(S): at 22:44

## 2023-08-29 RX ADMIN — APREPITANT 40 MILLIGRAM(S): 80 CAPSULE ORAL at 13:00

## 2023-08-29 RX ADMIN — CHLORHEXIDINE GLUCONATE 1 APPLICATION(S): 213 SOLUTION TOPICAL at 13:00

## 2023-08-29 RX ADMIN — Medication 400 MILLIGRAM(S): at 22:19

## 2023-08-29 RX ADMIN — SODIUM CHLORIDE 75 MILLILITER(S): 9 INJECTION, SOLUTION INTRAVENOUS at 18:25

## 2023-08-29 NOTE — ASU PATIENT PROFILE, ADULT - FALL HARM RISK - RISK INTERVENTIONS
Communicate Fall Risk and Risk Factors to all staff, patient, and family/Reinforce activity limits and safety measures with patient and family/Visual Cue: Yellow wristband/Bed in lowest position, wheels locked, appropriate side rails in place/Call bell, personal items and telephone in reach/Instruct patient to call for assistance before getting out of bed or chair/Non-slip footwear when patient is out of bed/[place] to call system/Physically safe environment - no spills, clutter or unnecessary equipment/Purposeful Proactive Rounding/Room/bathroom lighting operational, light cord in reach Communicate Fall Risk and Risk Factors to all staff, patient, and family/Reinforce activity limits and safety measures with patient and family/Visual Cue: Yellow wristband/Bed in lowest position, wheels locked, appropriate side rails in place/Call bell, personal items and telephone in reach/Instruct patient to call for assistance before getting out of bed or chair/Non-slip footwear when patient is out of bed/[place] to call system/Physically safe environment - no spills, clutter or unnecessary equipment/Purposeful Proactive Rounding/Room/bathroom lighting operational, light cord in reach Communicate Fall Risk and Risk Factors to all staff, patient, and family/Reinforce activity limits and safety measures with patient and family/Visual Cue: Yellow wristband/Bed in lowest position, wheels locked, appropriate side rails in place/Call bell, personal items and telephone in reach/Instruct patient to call for assistance before getting out of bed or chair/Non-slip footwear when patient is out of bed/[place] to call system/Physically safe environment - no spills, clutter or unnecessary equipment/Purposeful Proactive Rounding/Room/bathroom lighting operational, light cord in reach

## 2023-08-29 NOTE — ASU PATIENT PROFILE, ADULT - PROVIDER NOTIFICATION
Erythromycin Counseling:  I discussed with the patient the risks of erythromycin including but not limited to GI upset, allergic reaction, drug rash, diarrhea, increase in liver enzymes, and yeast infections. Declines

## 2023-08-29 NOTE — BRIEF OPERATIVE NOTE - NSICDXBRIEFPOSTOP_GEN_ALL_CORE_FT
POST-OP DIAGNOSIS:  Rupture of left quadriceps muscle, initial encounter 29-Aug-2023 16:20:04  Jacky English

## 2023-08-30 ENCOUNTER — TRANSCRIPTION ENCOUNTER (OUTPATIENT)
Age: 63
End: 2023-08-30

## 2023-08-30 LAB
ANION GAP SERPL CALC-SCNC: 7 MMOL/L — SIGNIFICANT CHANGE UP (ref 5–17)
BUN SERPL-MCNC: 16 MG/DL — SIGNIFICANT CHANGE UP (ref 7–23)
CALCIUM SERPL-MCNC: 9 MG/DL — SIGNIFICANT CHANGE UP (ref 8.4–10.5)
CHLORIDE SERPL-SCNC: 101 MMOL/L — SIGNIFICANT CHANGE UP (ref 96–108)
CO2 SERPL-SCNC: 28 MMOL/L — SIGNIFICANT CHANGE UP (ref 22–31)
CREAT SERPL-MCNC: 0.92 MG/DL — SIGNIFICANT CHANGE UP (ref 0.5–1.3)
EGFR: 95 ML/MIN/1.73M2 — SIGNIFICANT CHANGE UP
GLUCOSE SERPL-MCNC: 133 MG/DL — HIGH (ref 70–99)
HCT VFR BLD CALC: 40.3 % — SIGNIFICANT CHANGE UP (ref 39–50)
HGB BLD-MCNC: 13.4 G/DL — SIGNIFICANT CHANGE UP (ref 13–17)
MCHC RBC-ENTMCNC: 30.6 PG — SIGNIFICANT CHANGE UP (ref 27–34)
MCHC RBC-ENTMCNC: 33.3 GM/DL — SIGNIFICANT CHANGE UP (ref 32–36)
MCV RBC AUTO: 92 FL — SIGNIFICANT CHANGE UP (ref 80–100)
NRBC # BLD: 0 /100 WBCS — SIGNIFICANT CHANGE UP (ref 0–0)
PLATELET # BLD AUTO: 201 K/UL — SIGNIFICANT CHANGE UP (ref 150–400)
POTASSIUM SERPL-MCNC: 4.1 MMOL/L — SIGNIFICANT CHANGE UP (ref 3.5–5.3)
POTASSIUM SERPL-SCNC: 4.1 MMOL/L — SIGNIFICANT CHANGE UP (ref 3.5–5.3)
RBC # BLD: 4.38 M/UL — SIGNIFICANT CHANGE UP (ref 4.2–5.8)
RBC # FLD: 12.8 % — SIGNIFICANT CHANGE UP (ref 10.3–14.5)
SODIUM SERPL-SCNC: 136 MMOL/L — SIGNIFICANT CHANGE UP (ref 135–145)
WBC # BLD: 9.06 K/UL — SIGNIFICANT CHANGE UP (ref 3.8–10.5)
WBC # FLD AUTO: 9.06 K/UL — SIGNIFICANT CHANGE UP (ref 3.8–10.5)

## 2023-08-30 PROCEDURE — 99222 1ST HOSP IP/OBS MODERATE 55: CPT

## 2023-08-30 RX ORDER — OXYCODONE HYDROCHLORIDE 5 MG/1
1 TABLET ORAL
Qty: 0 | Refills: 0 | DISCHARGE
Start: 2023-08-30

## 2023-08-30 RX ORDER — ASPIRIN/CALCIUM CARB/MAGNESIUM 324 MG
1 TABLET ORAL
Qty: 0 | Refills: 0 | DISCHARGE
Start: 2023-08-30

## 2023-08-30 RX ORDER — ACETAMINOPHEN 500 MG
2 TABLET ORAL
Qty: 0 | Refills: 0 | DISCHARGE
Start: 2023-08-30

## 2023-08-30 RX ORDER — ASPIRIN/CALCIUM CARB/MAGNESIUM 324 MG
81 TABLET ORAL
Refills: 0 | Status: DISCONTINUED | OUTPATIENT
Start: 2023-08-30 | End: 2023-09-02

## 2023-08-30 RX ORDER — DIPHENHYDRAMINE HCL 50 MG
50 CAPSULE ORAL AT BEDTIME
Refills: 0 | Status: DISCONTINUED | OUTPATIENT
Start: 2023-08-30 | End: 2023-09-02

## 2023-08-30 RX ORDER — MAGNESIUM HYDROXIDE 400 MG/1
30 TABLET, CHEWABLE ORAL
Qty: 0 | Refills: 0 | DISCHARGE
Start: 2023-08-30

## 2023-08-30 RX ADMIN — Medication 50 MILLIGRAM(S): at 22:57

## 2023-08-30 RX ADMIN — Medication 1000 MILLIGRAM(S): at 14:37

## 2023-08-30 RX ADMIN — Medication 1000 MILLIGRAM(S): at 06:16

## 2023-08-30 RX ADMIN — Medication 1000 MILLIGRAM(S): at 06:18

## 2023-08-30 RX ADMIN — Medication 1000 MILLIGRAM(S): at 21:30

## 2023-08-30 RX ADMIN — Medication 1000 MILLIGRAM(S): at 21:35

## 2023-08-30 RX ADMIN — Medication 100 MILLIGRAM(S): at 06:15

## 2023-08-30 RX ADMIN — OXYCODONE HYDROCHLORIDE 5 MILLIGRAM(S): 5 TABLET ORAL at 23:38

## 2023-08-30 RX ADMIN — OXYCODONE HYDROCHLORIDE 5 MILLIGRAM(S): 5 TABLET ORAL at 23:00

## 2023-08-30 RX ADMIN — Medication 81 MILLIGRAM(S): at 17:27

## 2023-08-30 RX ADMIN — Medication 81 MILLIGRAM(S): at 06:16

## 2023-08-30 NOTE — DISCHARGE NOTE PROVIDER - NSDCCPTREATMENT_GEN_ALL_CORE_FT
PRINCIPAL PROCEDURE  Procedure: Repair of left quadriceps  Findings and Treatment: left quadriceps tendon rupture

## 2023-08-30 NOTE — PHYSICAL THERAPY INITIAL EVALUATION ADULT - IMPAIRMENTS CONTRIBUTING TO GAIT DEVIATIONS, PT EVAL
56 y/o F w/ PMHX of Depression, Anemia, HTN, HX TIA, HX ILR presenting w/ headache, lower extremity weakness and difficulty speech.  Symptoms resolved, blood pressure was uncontrolled on admission but better controlled.  Had neuro workup done on last admission, negative.  Cleared from medicine for discharge.
impaired balance/decreased strength

## 2023-08-30 NOTE — DISCHARGE NOTE PROVIDER - NSDCFUADDINST_GEN_ALL_CORE_FT
- Call your doctor if you experience:  • An increase in pain not controlled by pain medication or change in activity or  position.  • Temperature greater than 101° F.  • Redness, increased swelling or foul smelling drainage from or around the  incision.  • Numbness, tingling or a change in color or temperature of the operative leg.  • Call your doctor immediately if you experience chest pain, shortness of breath or calf pain.  Pain medicine has been prescribed for you, as needed, and it often causes constipation.  Take docusate sodium (Colace) 100mg 3x daily, while taking narcotic pain medication.   For Constipation :   • Increase your water intake. Drink at least 8 glasses of water daily.  • Try adding fiber to your diet by eating fruits, vegetables and foods that are rich in grains.  • If you do experience constipation, you may take an over-the-counter laxative such as, Senokot, Miralax or  Milk of Magnesia.   - Call your doctor if you experience:  • An increase in pain not controlled by pain medication or change in activity or  position.  • Temperature greater than 101° F.  • Numbness, tingling or a change in color or temperature of the operative lower extremity.  • Call your doctor immediately if you experience chest pain, shortness of breath or calf pain.  Pain medicine has been prescribed for you, as needed, and it often causes constipation.  Take docusate sodium (Colace) 100mg 3x daily, while taking narcotic pain medication.   For Constipation :   • Increase your water intake. Drink at least 8 glasses of water daily.  • Try adding fiber to your diet by eating fruits, vegetables and foods that are rich in grains.  • If you do experience constipation, you may take an over-the-counter laxative such as, Senokot, Miralax or  Milk of Magnesia.

## 2023-08-30 NOTE — DISCHARGE NOTE PROVIDER - NSDCFUSCHEDAPPT_GEN_ALL_CORE_FT
Napoleon Dunbar Physician [facility]  [address] [place] [address]  Scheduled Appointment: 09/14/2023     Napoleon Dunbar Physician [facility]  [address] [place] [address]  Scheduled Appointment: 09/14/2023     Napoleon Dunbar Physician [facility]  [place] [place] [address]  Scheduled Appointment: 09/14/2023

## 2023-08-30 NOTE — CARE COORDINATION ASSESSMENT. - NSPASTMEDSURGHISTORY_GEN_ALL_CORE_FT
PAST MEDICAL & SURGICAL HISTORY:  History of obstructive sleep apnea      Severe obesity (BMI >= 40)      No significant past surgical history

## 2023-08-30 NOTE — OCCUPATIONAL THERAPY INITIAL EVALUATION ADULT - ADDITIONAL COMMENTS
Pt lives in a private home [address] no railing 5 & 7 steps inside the home +stall shower  Pt owns crutches, [employer]

## 2023-08-30 NOTE — DISCHARGE NOTE PROVIDER - PROVIDER TOKENS
PROVIDER:[TOKEN:[[id number]],SCHEDULEDAPPT:[09/14/2023],SCHEDULEDAPPTTIME:[02:15 PM]] PROVIDER:[TOKEN:[[id number]],SCHEDULEDAPPT:[09/14/2023],SCHEDULEDAPPTTIME:[02:15 PM]] PROVIDER:[TOKEN:[[id number]],SCHEDULEDAPPT:[09/14/2023],SCHEDULEDAPPTTIME:[02:15 PM]]

## 2023-08-30 NOTE — DISCHARGE NOTE PROVIDER - NSDCCAREPROVSEEN_GEN_ALL_CORE_FT
Sanket, Calderon Barrera, Susana Liu, Kenia English, Napoleon Xie Sanket, Calderon Barrera, Susana Liu, Kenia English, Jacky Dunbar, Napoleon La, Domo Sanket, Calderon Liu, Kenia English, Jacky Dunbar, Napoleon La, Domo Sanket, Calderon Liu, Kenia English, Jacky Dunbar, Napoleon La, Jose Roberto Linares Sankte, Calderon Liu, Kenia English, Jacky Dunbar, Napoleon La, Jose Roberto Linares

## 2023-08-30 NOTE — PROGRESS NOTE ADULT - SUBJECTIVE AND OBJECTIVE BOX
Post Op Note- POD #1    SUBJECTIVE     61 yo Male status post repair of left quadriceps tendon.  Patient is alert and comfortable.  Pain is well controlled.  Denies chest pain/shortness of breath/nausea/vomiting.     OBJECTIVE    T(C): 36.4 (08-30-23 @ 08:15), Max: 37.1 (08-29-23 @ 21:45)  HR: 59 (08-30-23 @ 08:15) (59 - 92)  BP: 135/80 (08-30-23 @ 08:15) (110/94 - 155/92)  RR: 18 (08-30-23 @ 08:15) (13 - 20)  SpO2: 99% (08-30-23 @ 08:15) (94% - 99%)  Wt(kg): --      08-29 @ 07:01  -  08-30 @ 07:00  --------------------------------------------------------  IN: 0 mL / OUT: 3200 mL / NET: -3200 mL      PHYSICAL EXAM    Left leg cylinder cast C/D/I  Sensation Intact to Light Touch distally  EHL/FHL intact  Toes warm and pink, capillary refill <2 sec.   2+ DP pulses bilaterally      ASSESSMENT AND PLAN    - Orthopedically stable  - DVT prophylaxis: PAS + Aspirin 81 mg Q12h  - OOB as tolerated with PT/OT  - Pain control as clinically indicated  - Medicine to follow   - Continue antibiotics as per SCIP protocol  - Follow up Labs  - Incentive spirometry  - Discharge plan [facility], pending bed availability Post Op Note- POD #1    SUBJECTIVE     61 yo Male status post repair of left quadriceps tendon.  Patient is alert and comfortable.  Pain is well controlled.  Denies chest pain/shortness of breath/nausea/vomiting.     OBJECTIVE    T(C): 36.4 (08-30-23 @ 08:15), Max: 37.1 (08-29-23 @ 21:45)  HR: 59 (08-30-23 @ 08:15) (59 - 92)  BP: 135/80 (08-30-23 @ 08:15) (110/94 - 155/92)  RR: 18 (08-30-23 @ 08:15) (13 - 20)  SpO2: 99% (08-30-23 @ 08:15) (94% - 99%)  Wt(kg): --      08-29 @ 07:01  -  08-30 @ 07:00  --------------------------------------------------------  IN: 0 mL / OUT: 3200 mL / NET: -3200 mL      PHYSICAL EXAM    Left leg cylinder cast C/D/I  Sensation Intact to Light Touch distally  EHL/FHL intact  Toes warm and pink, capillary refill <2 sec.   2+ DP pulses bilaterally      ASSESSMENT AND PLAN    - Orthopedically stable  - DVT prophylaxis: PAS + Aspirin 81 mg Q12h  - OOB as tolerated with PT/OT  - Pain control as clinically indicated  - Medicine to follow   - Continue antibiotics as per SCIP protocol  - Follow up Labs  - Incentive spirometry  - Discharge plan [facility], pending bed availability Post Op Note- POD #1    SUBJECTIVE     63 yo Male status post repair of left quadriceps tendon.  Patient is alert and comfortable.  Pain is well controlled.  Denies chest pain/shortness of breath/nausea/vomiting.     OBJECTIVE    T(C): 36.4 (08-30-23 @ 08:15), Max: 37.1 (08-29-23 @ 21:45)  HR: 59 (08-30-23 @ 08:15) (59 - 92)  BP: 135/80 (08-30-23 @ 08:15) (110/94 - 155/92)  RR: 18 (08-30-23 @ 08:15) (13 - 20)  SpO2: 99% (08-30-23 @ 08:15) (94% - 99%)  Wt(kg): --      08-29 @ 07:01  -  08-30 @ 07:00  --------------------------------------------------------  IN: 0 mL / OUT: 3200 mL / NET: -3200 mL      PHYSICAL EXAM    Left leg cylinder cast C/D/I  Sensation Intact to Light Touch distally  EHL/FHL intact  Toes warm and pink, capillary refill <2 sec.   2+ DP pulses bilaterally      ASSESSMENT AND PLAN    - Orthopedically stable  - DVT prophylaxis: PAS + Aspirin 81 mg Q12h  - OOB as tolerated with PT/OT  - Pain control as clinically indicated  - Medicine to follow   - Continue antibiotics as per SCIP protocol  - Follow up Labs  - Incentive spirometry  - Discharge plan [facility], pending bed availability

## 2023-08-30 NOTE — SOCIAL WORK PROGRESS NOTE - NSSWPROGRESSNOTE_GEN_ALL_CORE
met with patient and sister Abi [phone] bedside. He gave permission to speak with her as well, he requested referrals to [facility] no bed, [facility] [place] out of network and [facility] also no beds. I updated them and he wanted [facility] which can offer bed. [facility] cannot offer bed and [facility] was out of network. He accepted bed at [facility]. His family can bring c pap if [facility] cannot order one. Once they provide NPI and information for precert, I will contact insurance to setup precertification with insurance.   met with patient and sister Abi [phone] bedside. He gave permission to speak with her as well, he requested referrals to [facility] no bed, [facility] out of network and [facility] also no beds. I updated them and he wanted [facility] which can offer bed. [facility] cannot offer bed and [facility] was out of network. He accepted bed at [facility]. His family can bring c pap if [facility] cannot order one. Once they provide NPI and information for precert, I will contact insurance to setup precertification with insurance.   met with patient and sister Abi [phone] bedside. He gave permission to speak with her as well, he requested referrals to [facility] no bed, [facility] out of network and [facility] also no beds. I updated them and he wanted [facility] which can offer bed. [facility] cannot offer bed and [facility] was out of network. He accepted bed at [facility]. His family can bring c pap if [facility] cannot order one. Once they provide NPI and information for precert, I will contact insurance to setup precertification with insurance.   met with patient and sister Abi [phone] bedside. He gave permission to speak with her as well, he requested referrals to [facility] no bed, [facility] out of network and [facility] also no beds. I updated them and he wanted [facility] which can offer bed. [facility] cannot offer bed and [facility] was out of network. He accepted bed at [facility]. His family can bring c pap if [facility] cannot order one. I called insurance [phone] spoke with Michaela who setup case and provided pending reference [id number] and told me to submit POA to fax [phone]. I sent POA and will setup ambulette for Thursday once we secure approval from insurance. Patient's family can bring cpap for [facility] if needed   met with patient and sister Abi [phone] bedside. He gave permission to speak with her as well, he requested referrals to [facility] no bed, [facility] out of network and [facility] also no beds. I updated them and he wanted [facility] which can offer bed. [facility] cannot offer bed and [facility] was out of network. He accepted bed at [facility]. His family can bring c pap if [facility] cannot order one. I called insurance [phone] spoke with Michaela who setup case and provided pending reference [id number] and told me to submit POA to fax [phone]. I sent POA and will setup ambulette for Thursday once we secure approval from insurance. Patient's family can bring cpap for [facility] if needed   met with patient and sister Abi [phone] bedside. He gave permission to speak with her as well, he requested referrals to [facility] no bed, [facility] out of network and [facility] also no beds. I updated them and he wanted [facility] which can offer bed. [facility] cannot offer bed and [facility] was out of network. He accepted bed at [facility]. His family can bring c pap if [facility] cannot order one. I called insurance [phone] spoke with Michaela who setup case and provided pending reference [id number] and told me to submit POA to fax [phone]. I sent POA and will setup ambulette for Thursday once we secure approval from insurance. Patient's family can bring cpap for [facility] if needed

## 2023-08-30 NOTE — CARE COORDINATION ASSESSMENT. - OTHER PERTINENT DISCHARGE PLANNING INFORMATION:
met with this 62 year old male admitted from home for left quad repair. SW received consult for TEA which I marked as completed. Patient resides in a private home with his mother split level. His sister Abi [phone] is involved but she lives in [place]. Patient fell 2 weeks ago and anticipated having surgery and needing rehab. He said he contacted insurance and selected [employer], [employer] and [employer]. I explained process and educated him on ambulette transport to rehab. I requested [facility] and spoke with physical therapy about consult from MD for rehab. Plan for TEA pending bed availability, insurance approval and medically optimized. Patient had no pain complaints and seems to be coping well with admission.   met with this 62 year old male admitted from home for left quad repair. SW received consult for TEA which I marked as completed. Patient resides in a private home with his mother split level. His sister Abi [phone] is involved but she lives in [place]. Patient fell 2 weeks ago and anticipated having surgery and needing rehab. He said he contacted insurance and selected [facility], [employer] and [facility]. I explained process and educated him on ambulette transport to rehab. I requested CAROLA and spoke with physical therapy about consult from MD for rehab. Plan for TEA pending bed availability, insurance approval and medically optimized. Patient had no pain complaints and seems to be coping well with admission.   met with this 62 year old male admitted from home for left quad repair. SW received consult for TEA which I marked as completed. Patient resides in a private home with his mother split level. His sister Abi [phone] is involved but she lives in [place]. Patient fell 2 weeks ago and anticipated having surgery and needing rehab. He said he contacted insurance and selected [employer], [facility] and [email or web address]. I explained process and educated him on ambulette transport to rehab. I requested CAROL[facility] and spoke with physical therapy about consult from MD for rehab. Plan for TEA pending bed availability, insurance approval and medically optimized. Patient had no pain complaints and seems to be coping well with admission.

## 2023-08-30 NOTE — CONSULT NOTE ADULT - ASSESSMENT
63 yo male, pmh of obesity, jere, presenting for left quadriceps repair  - dvt ppx, pain control as per ortho  - pt/ot  - likely needs rehab  - continue cpap at night  - will start benadryl prn for sleep  - no medical contraindication to DC when rehab is arranged

## 2023-08-30 NOTE — CARE COORDINATION ASSESSMENT. - SOCIAL WORK INTERVENTIONS
[place], [place] per patient request/coordination of care/referral to [facility] [place], [place] per patient request/coordination of care/referral to [facility] [place], [place] per patient request/coordination of care/referral to Sub-Acute Rehabilitation Facility

## 2023-08-30 NOTE — CARE COORDINATION ASSESSMENT. - NSCAREPROVIDERS_GEN_ALL_CORE_FT
CARE PROVIDERS:  Administration: Skye Sweet  Administration: Jorge Alberto Mendoza  Administration: Jae Barker  Admitting: Napoleon Dunbar  Attending: Napoleon Dunbar  Nurse: Estephanie Ortiz  Nurse: Aislinn Godoy  Nurse: Mateo Weber  Nurse: Sergei Osorio  Override: Aislinn Godoy  Override: Percy Galeana  PCA/Nursing Assistant: Argelia Bryant  PCA/Nursing Assistant: Marianna Dumont  Physical Therapy: Silvio Cheung  Physical Therapy: Owen Estrada  Primary Team: Calderon Coles  Primary Team: Jacky English  Primary Team: Susana Barrera  Primary Team: Kenia Liu  Primary Team: Domo La  Respiratory Therapy: Alisah Patel  : Telma Mccoy// Supp. Assoc.: Ellen Taylor   CARE PROVIDERS:  Administration: Skye Sweet  Administration: Jorge Alberto Mendoza  Administration: Jae Barker  Admitting: Napoleon Dunbar  Attending: Napoleon Dunbar  Nurse: Estephanie Ortiz  Nurse: Aislinn Godoy  Nurse: Mateo Weber  Nurse: Sergei Osorio  Override: Aislinn Godoy  Override: Percy Galeana  PCA/Nursing Assistant: Argelia Bryant  PCA/Nursing Assistant: Marianna Dumont  Physical Therapy: Silvio Cheung  Physical Therapy: Owen Estrada  Primary Team: Calderon Coles  Primary Team: Jacky English  Primary Team: Susana Barrera  Primary Team: Kenia Liu  Primary Team: Domo La  Respiratory Therapy: Alisha Patel  : Telma Mccoy// Supp. Assoc.: Ellen Taylor

## 2023-08-30 NOTE — PROGRESS NOTE ADULT - NSPROGADDITIONALINFOA_GEN_ALL_CORE
I performed a history and physical exam of the patient and discussed their management with the advanced care provider. I reviewed the advanced care provider's note and agree with the documented findings and plan of care. My medical decision making and objective findings are found above.        Napoleon Dunbar MD

## 2023-08-30 NOTE — PHYSICAL THERAPY INITIAL EVALUATION ADULT - RANGE OF MOTION EXAMINATION, REHAB EVAL
left LE n/t due to long leg cast/bilateral upper extremity ROM was WFL (within functional limits)/Right LE ROM was WFL (within functional limits)

## 2023-08-30 NOTE — DISCHARGE NOTE PROVIDER - HOSPITAL COURSE
This patient was admitted to [facility] with a left quadriceps tendon rupture, after sustaining a fall two weeks ago.  Patient underwent Pre-Surgical Testing and was medically cleared to undergo left quadriceps tendon repair by Dr. Dunbar. Procedure was well tolerated.  No operative or sujey-operative complications arose during patient's hospital course.  Patient received antibiotic according to SCIP guidelines for infection prevention. Aspirin 81 mg Q12h was given for DVT prophylaxis, in addition to the use of SCDs.  Anesthesia, Medical Hospitalist, Physical Therapy and Occupational Therapy were consulted. Patient is stable for discharge with a good prognosis.  Appropriate discharge instructions and medications are provided in this document.   This patient was admitted to [facility] with a left quadriceps tendon rupture, after sustaining a fall two weeks ago.  Patient underwent Pre-Surgical Testing and was medically cleared to undergo left quadriceps tendon repair by Dr. Dunbar. Procedure was well tolerated.  No operative or sujey-operative complications arose during patient's hospital course.  Patient received antibiotic according to SCIP guidelines for infection prevention. Aspirin 81 mg Q12h was given for DVT prophylaxis, in addition to the use of SCDs.  Anesthesia, Medical Hospitalist, Physical Therapy and Occupational Therapy were consulted. Patient is stable for discharge with a good prognosis.  Appropriate discharge instructions and medications are provided in this document.   This patient was admitted to [facility] with a left quadriceps tendon rupture, after sustaining a fall two weeks ago.  Patient underwent Pre-Surgical Testing and was medically cleared to undergo left quadriceps tendon repair by Dr. Dunbar. Procedure was well tolerated.  No operative or sujey-operative complications arose during patient's hospital course.  Patient received antibiotic according to SCIP guidelines for infection prevention. Aspirin 81 mg Q12h was given for DVT prophylaxis, in addition to the use of SCDs.  Anesthesia, Medical Hospitalist, Physical Therapy and Occupational Therapy were consulted. Patient is stable for discharge with a good prognosis.  Appropriate discharge instructions and medications are provided in this document.

## 2023-08-30 NOTE — CARE COORDINATION ASSESSMENT. - LIVES WITH, PROFILE
resides with mother, patient reported she can manage at  home alone, sister was here yesterday she lives in [place]/other relative

## 2023-08-30 NOTE — CONSULT NOTE ADULT - SUBJECTIVE AND OBJECTIVE BOX
HPI:  63 yo male, h/o obesity, jere, admitted for quadriceps repair on left, denies current pain, dizziness, nausea, vomiting, fever, chills. Wants something for sleep.    PAST MEDICAL & SURGICAL HISTORY:  Severe obesity (BMI >= 40)      History of obstructive sleep apnea      No significant past surgical history          ANTIMICROBIAL:    CARDIOVASCULAR:    PULMONARY:    NEUROLOGIC:  acetaminophen     Tablet .. 1000 milliGRAM(s) Oral every 8 hours  diphenhydrAMINE 50 milliGRAM(s) Oral at bedtime PRN  HYDROmorphone  Injectable 0.5 milliGRAM(s) IV Push every 3 hours PRN  ondansetron Injectable 4 milliGRAM(s) IV Push every 6 hours PRN  oxyCODONE    IR 10 milliGRAM(s) Oral every 4 hours PRN  oxyCODONE    IR 5 milliGRAM(s) Oral every 4 hours PRN    ONCOLOGIC:    HEMATOLOGIC:  aspirin enteric coated 81 milliGRAM(s) Oral two times a day    GATROINTESTINAL:  magnesium hydroxide Suspension 30 milliLiter(s) Oral daily PRN     MEDS:    ENDO/METABOLIC:    IV FLUID/NUTRITION:  lactated ringers. 1000 milliLiter(s) IV Continuous <Continuous>    TOPICAL:  chlorhexidine 2% Cloths 1 Application(s) Topical daily    IMMUNOLOGIC & OTHER        Allergies    No Known Allergies    Intolerances      PAST MEDICAL & SURGICAL HISTORY:  Severe obesity (BMI >= 40)      History of obstructive sleep apnea      No significant past surgical history        SOCIAL HISTORY:    FAMILY HISTORY:  FH: arrhythmia (Father)    FHx: hypertension (Mother)        Vital Signs Last 24 Hrs  T(C): 36.4 (30 Aug 2023 08:15), Max: 37.1 (29 Aug 2023 21:45)  T(F): 97.5 (30 Aug 2023 08:15), Max: 98.7 (29 Aug 2023 21:45)  HR: 59 (30 Aug 2023 08:15) (59 - 92)  BP: 135/80 (30 Aug 2023 08:15) (110/94 - 155/92)  BP(mean): --  RR: 18 (30 Aug 2023 08:15) (13 - 20)  SpO2: 99% (30 Aug 2023 08:15) (94% - 99%)    Parameters below as of 30 Aug 2023 08:15  Patient On (Oxygen Delivery Method): room air          I&O's Detail    29 Aug 2023 07:01  -  30 Aug 2023 07:00  --------------------------------------------------------  IN:  Total IN: 0 mL    OUT:    Voided (mL): 3200 mL  Total OUT: 3200 mL    Total NET: -3200 mL        Daily Height in cm: 177.8 (29 Aug 2023 13:00)    Daily     REVIEW OF SYSTEMS:    as per hpi, other systems reviewed and are negative    PHYSICAL EXAM:    GENERAL: NAD, older male, obese  HEAD:  Atraumatic, Normocephalic  EYES: EOMI, PERRLA, conjunctiva and sclera clear  ENMT: No tonsillar erythema Moist mucous membranes   NECK: Supple, No JVD noted  NERVOUS SYSTEM:  Alert & Oriented X3, Good concentration; no focal deficits noted  CHEST/LUNG: Clear to percussion bilaterally; No rales, rhonchi, wheezing, or rubs  HEART: Regular rate and rhythm; No murmurs, rubs, or gallops  ABDOMEN: Soft, Nontender, BS +  EXTREMITIES: no clubbing, cyanosis, left leg in full cast to mid thigh  LYMPH: No lymphadenopathy   SKIN: No rashes or lesions      LABS:                        13.4   9.06  )-----------( 201      ( 30 Aug 2023 06:30 )             40.3     08-30    136  |  101  |  16  ----------------------------<  133<H>  4.1   |  28  |  0.92    Ca    9.0      30 Aug 2023 06:30        Urinalysis Basic - ( 30 Aug 2023 06:30 )    Color: x / Appearance: x / SG: x / pH: x  Gluc: 133 mg/dL / Ketone: x  / Bili: x / Urobili: x   Blood: x / Protein: x / Nitrite: x   Leuk Esterase: x / RBC: x / WBC x   Sq Epi: x / Non Sq Epi: x / Bacteria: x              RADIOLOGY & ADDITIONAL STUDIES:

## 2023-08-30 NOTE — PATIENT CHOICE NOTE. - NSPTCHOICESTATE_GEN_ALL_CORE
I have met with the patient and/or caregiver to discuss discharge goals and treatment plan. Patient and/or caregiver also provided with instructions on accessing the CMS Compare websites for additional information related to Post Acute Provider quality and resource use measures to assist them in evaluation of the providers and in selecting their post-acute provider of choice. Patient and caregiver were informed of the facilities that are owned and/or operated by [facility]. I have discussed with the patient the availability of in-network facilities and providers. Patient and caregiver provided with a list of post-acute providers whose services are appropriate to the discharge plans and patient needs.     For patient requiring durable medical equipment, patient and/or caregiver were informed that they have the right to request who provides the required equipment. I have met with the patient and/or caregiver to discuss discharge goals and treatment plan. Patient and/or caregiver also provided with instructions on accessing the CMS Compare websites for additional information related to Post Acute Provider quality and resource use measures to assist them in evaluation of the providers and in selecting their post-acute provider of choice. Patient and caregiver were informed of the facilities that are owned and/or operated by [facility]. I have discussed with the patient the availability of in-network facilities and providers. Patient and caregiver provided with a list of post-acute providers whose services are appropriate to the discharge plans and patient needs.     For patient requiring durable medical equipment, patient and/or caregiver were informed that they have the right to request who provides the required equipment. I have met with the patient and/or caregiver to discuss discharge goals and treatment plan. Patient and/or caregiver also provided with instructions on accessing the CMS Compare websites for additional information related to Post Acute Provider quality and resource use measures to assist them in evaluation of the providers and in selecting their post-acute provider of choice. Patient and caregiver were informed of the facilities that are owned and/or operated by [facility]. I have discussed with the patient the availability of in-network facilities and providers. Patient and caregiver provided with a list of post-acute providers whose services are appropriate to the discharge plans and patient needs.     For patient requiring durable medical equipment, patient and/or caregiver were informed that they have the right to request who provides the required equipment.

## 2023-08-30 NOTE — PHYSICAL THERAPY INITIAL EVALUATION ADULT - ADDITIONAL COMMENTS
Lives in house with mother.  1 LILIANA without railing; 5 stairs to main level and 7 to bedroom with HRs.  Owns quad cane, crutches.  Has tub.

## 2023-08-30 NOTE — DISCHARGE NOTE PROVIDER - CARE PROVIDER_API CALL
Napoleon Dunbar  Orthopaedic Surgery  [address] [place] [address]  Phone: [phone]  Fax: [phone]  Scheduled Appointment: 09/14/2023 02:15 PM   Napoleon Dunbar  Orthopaedic Surgery  [address] [place] [address]  Phone: [phone]  Fax: [phone]  Scheduled Appointment: 09/14/2023 02:15 PM   Napoleon Dunbar  Orthopaedic Surgery  [address] [place] [address]  Phone: [phone]  Fax: [phone]  Scheduled Appointment: 09/14/2023 02:15 PM

## 2023-08-30 NOTE — DISCHARGE NOTE PROVIDER - NSDCCPCAREPLAN_GEN_ALL_CORE_FT
PRINCIPAL DISCHARGE DIAGNOSIS  Diagnosis: Rupture of left quadriceps tendon  Assessment and Plan of Treatment: Your left leg is in a cylinder cast. Keep the cast dry, clean and intact until seen in office...cover with plastic when showering to keep dry!  You may bear weight on the left leg, as tolerated.

## 2023-08-31 PROCEDURE — 99232 SBSQ HOSP IP/OBS MODERATE 35: CPT

## 2023-08-31 RX ADMIN — Medication 1000 MILLIGRAM(S): at 05:01

## 2023-08-31 RX ADMIN — Medication 1000 MILLIGRAM(S): at 21:12

## 2023-08-31 RX ADMIN — Medication 81 MILLIGRAM(S): at 18:03

## 2023-08-31 RX ADMIN — OXYCODONE HYDROCHLORIDE 5 MILLIGRAM(S): 5 TABLET ORAL at 23:02

## 2023-08-31 RX ADMIN — OXYCODONE HYDROCHLORIDE 5 MILLIGRAM(S): 5 TABLET ORAL at 05:43

## 2023-08-31 RX ADMIN — Medication 1000 MILLIGRAM(S): at 13:38

## 2023-08-31 RX ADMIN — Medication 81 MILLIGRAM(S): at 05:01

## 2023-08-31 RX ADMIN — OXYCODONE HYDROCHLORIDE 5 MILLIGRAM(S): 5 TABLET ORAL at 23:48

## 2023-08-31 RX ADMIN — Medication 1000 MILLIGRAM(S): at 13:40

## 2023-08-31 RX ADMIN — Medication 1000 MILLIGRAM(S): at 21:23

## 2023-08-31 RX ADMIN — OXYCODONE HYDROCHLORIDE 5 MILLIGRAM(S): 5 TABLET ORAL at 05:01

## 2023-08-31 RX ADMIN — OXYCODONE HYDROCHLORIDE 5 MILLIGRAM(S): 5 TABLET ORAL at 14:12

## 2023-08-31 RX ADMIN — Medication 1000 MILLIGRAM(S): at 05:04

## 2023-08-31 NOTE — PROGRESS NOTE ADULT - SUBJECTIVE AND OBJECTIVE BOX
Patient is a 62y old  Male who presents with a chief complaint of left quadriceps tendon repair (30 Aug 2023 11:07)      INTERVAL HPI/OVERNIGHT EVENTS:  Patient seen and examined, having some pain in leg at knee, improved with medication, denies fever, chills, dizziness.     ROS reviewed and is otherwise negative        Vital Signs Last 24 Hrs  T(C): 36.4 (31 Aug 2023 09:31), Max: 36.9 (30 Aug 2023 15:00)  T(F): 97.6 (31 Aug 2023 09:31), Max: 98.5 (31 Aug 2023 00:27)  HR: 69 (31 Aug 2023 09:31) (56 - 78)  BP: 107/69 (31 Aug 2023 09:31) (107/69 - 151/86)  BP(mean): --  RR: 17 (31 Aug 2023 09:31) (16 - 17)  SpO2: 99% (31 Aug 2023 09:31) (95% - 99%)        PHYSICAL EXAM:  GENERAL: NAD, older male, obese  HEAD:  Atraumatic, Normocephalic  EYES: EOMI, PERRLA, conjunctiva and sclera clear  ENMT: No tonsillar erythema Moist mucous membranes   NECK: Supple, No JVD noted  NERVOUS SYSTEM:  Alert & Oriented X3, Good concentration; no focal deficits noted  CHEST/LUNG: Clear to percussion bilaterally; No rales, rhonchi, wheezing, or rubs  HEART: Regular rate and rhythm; No murmurs, rubs, or gallops  ABDOMEN: Soft, Nontender, BS +  EXTREMITIES: no clubbing, cyanosis, left leg in full cast to mid thigh  LYMPH: No lymphadenopathy   SKIN: No rashes or lesions    MEDICATIONS  (STANDING):  acetaminophen     Tablet .. 1000 milliGRAM(s) Oral every 8 hours  aspirin enteric coated 81 milliGRAM(s) Oral two times a day  chlorhexidine 2% Cloths 1 Application(s) Topical daily  lactated ringers. 1000 milliLiter(s) (125 mL/Hr) IV Continuous <Continuous>    MEDICATIONS  (PRN):  diphenhydrAMINE 50 milliGRAM(s) Oral at bedtime PRN Insomnia  HYDROmorphone  Injectable 0.5 milliGRAM(s) IV Push every 3 hours PRN breakthrough pain  magnesium hydroxide Suspension 30 milliLiter(s) Oral daily PRN Constipation  ondansetron Injectable 4 milliGRAM(s) IV Push every 6 hours PRN Nausea and/or Vomiting  oxyCODONE    IR 10 milliGRAM(s) Oral every 4 hours PRN Severe Pain (7 - 10)  oxyCODONE    IR 5 milliGRAM(s) Oral every 4 hours PRN Moderate Pain (4 - 6)      Allergies    No Known Allergies    Intolerances          LABS:      Ca    9.0        30 Aug 2023 06:30        Urinalysis Basic - ( 30 Aug 2023 06:30 )    Color: x / Appearance: x / SG: x / pH: x  Gluc: 133 mg/dL / Ketone: x  / Bili: x / Urobili: x   Blood: x / Protein: x / Nitrite: x   Leuk Esterase: x / RBC: x / WBC x   Sq Epi: x / Non Sq Epi: x / Bacteria: x      CAPILLARY BLOOD GLUCOSE          RADIOLOGY & ADDITIONAL TESTS:        Care Discussed with Consultants/Other Providers:    Advanced Directives: [ ] DNR  [ ] No feeding tube  [ ] MOLST in chart  [ ] MOLST completed today  [ ] Unknown

## 2023-08-31 NOTE — SOCIAL WORK PROGRESS NOTE - NSSWPROGRESSNOTE_GEN_ALL_CORE
received call from patient that he prefers [facility]. I did refer him there. We received voicemail from insurance. CM from insurance Bere [phone]. Request for subacute rehab denied after being reviewed by medical director with reference #[id number]. The message said that there was no skilled need and patient could have needs met with home care. Peer to peer [phone] about criteria and rationale for decision. Not needed to start appeal. If need to appeal in denial letter we will have number or for expedited appeal [phone].  will update team and follow for recommendations for home or for a peer to peer. SW will update patient as well with determination.   received call from patient that he prefers [facility]. I did refer him there. We received voicemail from insurance. CM from insurance Bere [phone]. Request for subacute rehab denied after being reviewed by medical director with reference #[id number]. The message said that there was no skilled need and patient could have needs met with home care. Peer to peer [phone] about criteria and rationale for decision. Not needed to start appeal. If need to appeal in denial letter we will have number or for expedited appeal [phone].  will update team and follow for recommendations for home or for a peer to peer. SW will update patient as well with determination.   received call from patient that he prefers [facility]. I did refer him there. We received voicemail from insurance. CM from insurance Bere [phone]. Request for subacute rehab denied after being reviewed by medical director with reference #[id number]. The message said that there was no skilled need and patient could have needs met with home care. Peer to peer [phone] about criteria and rationale for decision. Not needed to start appeal. If need to appeal in denial letter we will have number or for expedited appeal [phone].  will update team and follow for recommendations for home or for a peer to peer. SW will update patient as well with determination.

## 2023-08-31 NOTE — PROGRESS NOTE ADULT - ASSESSMENT
63 yo male, pmh of obesity, jere, presenting for left quadriceps repair  - dvt ppx, pain control as per ortho  - pt/ot  -  needs rehab given full leg cast preventing ambulation up stairs and in confined space  - continue cpap at night  -  benadryl prn for sleep  - no medical contraindication to DC when rehab is arranged

## 2023-08-31 NOTE — PROGRESS NOTE ADULT - SUBJECTIVE AND OBJECTIVE BOX
ORTHOPEDIC PA PROGRESS NOTE  SOLEDAD KELLER      62y Male                                                                                                                               POD #    2    STATUS POST:               Pre-Op Dx: Rupture of left quadriceps muscle      Post-Op Dx:  Rupture of left quadriceps muscle, initial encounter      Procedure: Repair of left quadriceps                                                  Pain (0-10):   Current Pain Management:  [ ] PCA   [ ] Po Analgesics [ X] IM /IV [employer]     T(F): 98.5  HR: 56  BP: 151/86  RR: 16  SpO2: 98%                        13.4   9.06  )-----------( 201      ( 30 Aug 2023 06:30 )             40.3                     08-30    136  |  101  |  16  ----------------------------<  133<H>  4.1   |  28  |  0.92    Ca    9.0      30 Aug 2023 06:30      Physical Exam :    -   Cast intact   -   Wound C/D/I.   -   Distal Neurvascular status intact grossly.   -   Warm well perfused; capillary refill <3 seconds   -   (+)EHL/FHL 5/5  -   (+) Sensation to light touch      A/P: 62y Male s/p Rupture of left quadriceps muscle, initial encounter      -   Ortho Stable  -   Pain control   -   Medicine to follow  -   DVT ppx:     [ ]SCDs     [X ] ASA     [ ] Eliquis     [ ] Lovenox  -   Weight bearing status:  WBAT [X ]        PWB    [ ]     TTWB  [ ]      NWB  [ ]   -  Dispo:     Home [ ]     Acute Rehab [ ]     TEA [X ]     TBD [ ]

## 2023-09-01 PROCEDURE — 99232 SBSQ HOSP IP/OBS MODERATE 35: CPT

## 2023-09-01 RX ADMIN — OXYCODONE HYDROCHLORIDE 5 MILLIGRAM(S): 5 TABLET ORAL at 11:21

## 2023-09-01 RX ADMIN — Medication 1000 MILLIGRAM(S): at 13:15

## 2023-09-01 RX ADMIN — Medication 1000 MILLIGRAM(S): at 14:23

## 2023-09-01 RX ADMIN — Medication 1000 MILLIGRAM(S): at 05:20

## 2023-09-01 RX ADMIN — Medication 1000 MILLIGRAM(S): at 21:56

## 2023-09-01 RX ADMIN — MAGNESIUM HYDROXIDE 30 MILLILITER(S): 400 TABLET, CHEWABLE ORAL at 13:15

## 2023-09-01 RX ADMIN — Medication 1000 MILLIGRAM(S): at 05:21

## 2023-09-01 RX ADMIN — Medication 81 MILLIGRAM(S): at 17:04

## 2023-09-01 RX ADMIN — OXYCODONE HYDROCHLORIDE 5 MILLIGRAM(S): 5 TABLET ORAL at 12:13

## 2023-09-01 RX ADMIN — OXYCODONE HYDROCHLORIDE 10 MILLIGRAM(S): 5 TABLET ORAL at 23:05

## 2023-09-01 RX ADMIN — Medication 81 MILLIGRAM(S): at 05:20

## 2023-09-01 NOTE — PROGRESS NOTE ADULT - SUBJECTIVE AND OBJECTIVE BOX
Patient is a 62y old  Male who presents with a chief complaint of left quadricep tendon repair (31 Aug 2023 12:06)      INTERVAL HPI/OVERNIGHT EVENTS:  Patient seen and examined, complaining of some knee pain, improved with meds, no fever, chills, nausea, vomiting    ROS reviewed and is otherwise negative        Vital Signs Last 24 Hrs  T(C): 36.4 (01 Sep 2023 07:37), Max: 36.9 (31 Aug 2023 23:30)  T(F): 97.5 (01 Sep 2023 07:37), Max: 98.5 (31 Aug 2023 23:30)  HR: 73 (01 Sep 2023 07:37) (69 - 73)  BP: 147/85 (01 Sep 2023 07:37) (147/85 - 159/70)  BP(mean): --  RR: 18 (01 Sep 2023 07:37) (18 - 18)  SpO2: 95% (01 Sep 2023 07:37) (93% - 96%)    Parameters below as of 01 Sep 2023 07:37  Patient On (Oxygen Delivery Method): BiPAP/CPAP        PHYSICAL EXAM:  GENERAL: NAD,  obese  HEAD:  Atraumatic, Normocephalic  EYES: EOMI, PERRLA  ENMT: Moist mucous membranes, Good dentition, No lesions   NECK: Supple, No JVD, Normal thyroid  NERVOUS SYSTEM:  Alert & Oriented X3, Good concentration; All 4 extremities mobile, no gross sensory deficits.   CHEST/LUNG: Clear to auscultation bilaterally; No rales, rhonchi, wheezing, or rubs  HEART: Regular rate and rhythm; No murmurs, rubs, or gallops  ABDOMEN: Soft, Nontender, Nondistended; Bowel sounds present  EXTREMITIES:  + Pulses, left leg in full cast  SKIN: No rashes or lesions    MEDICATIONS  (STANDING):  acetaminophen     Tablet .. 1000 milliGRAM(s) Oral every 8 hours  aspirin enteric coated 81 milliGRAM(s) Oral two times a day  chlorhexidine 2% Cloths 1 Application(s) Topical daily  lactated ringers. 1000 milliLiter(s) (125 mL/Hr) IV Continuous <Continuous>    MEDICATIONS  (PRN):  diphenhydrAMINE 50 milliGRAM(s) Oral at bedtime PRN Insomnia  HYDROmorphone  Injectable 0.5 milliGRAM(s) IV Push every 3 hours PRN breakthrough pain  magnesium hydroxide Suspension 30 milliLiter(s) Oral daily PRN Constipation  ondansetron Injectable 4 milliGRAM(s) IV Push every 6 hours PRN Nausea and/or Vomiting  oxyCODONE    IR 5 milliGRAM(s) Oral every 4 hours PRN Moderate Pain (4 - 6)  oxyCODONE    IR 10 milliGRAM(s) Oral every 4 hours PRN Severe Pain (7 - 10)      Allergies    No Known Allergies    Intolerances          LABS:              CAPILLARY BLOOD GLUCOSE          RADIOLOGY & ADDITIONAL TESTS:        Care Discussed with Consultants/Other Providers:    Advanced Directives: [ ] DNR  [ ] No feeding tube  [ ] MOLST in chart  [ ] MOLST completed today  [ ] Unknown

## 2023-09-01 NOTE — PROGRESS NOTE ADULT - SUBJECTIVE AND OBJECTIVE BOX
POD#:  1  S/P: Left Quadriceps tendon repair                  SUBJECTIVE: Patient seen and examined at bedside. Denies nausea, vomiting, diarrhea, chest pain, shortness of breath, headache, dizziness, numbness, tingling. Patient states desire to be discharged to rehab today.  Reported Pain Score = 2/10     OBJECTIVE:     Vital Signs Last 24 Hrs  T(C): 36.4 (01 Sep 2023 07:37), Max: 36.9 (31 Aug 2023 23:30)  T(F): 97.5 (01 Sep 2023 07:37), Max: 98.5 (31 Aug 2023 23:30)  HR: 73 (01 Sep 2023 07:37) (69 - 73)  BP: 147/85 (01 Sep 2023 07:37) (107/69 - 159/70)  BP(mean): --  RR: 18 (01 Sep 2023 07:37) (17 - 18)  SpO2: 95% (01 Sep 2023 07:37) (93% - 99%)    Parameters below as of 01 Sep 2023 07:37  Patient On (Oxygen Delivery Method): BiPAP/CPAP    Left Knee:          Dressing: cylinder cast clean/dry/intact    Bilateral LEs:         Sensation:  intact to light touch          Motor exam:  5/5 dorsiflexion/plantarflexion/EHL          2+ DP pulses          calf supple, NT         SCDs in place    LABS:      MEDICATIONS:  Anticoagulation:  aspirin enteric coated 81 milliGRAM(s) Oral two times a day      Pain medications:   acetaminophen     Tablet .. 1000 milliGRAM(s) Oral every 8 hours  diphenhydrAMINE 50 milliGRAM(s) Oral at bedtime PRN  HYDROmorphone  Injectable 0.5 milliGRAM(s) IV Push every 3 hours PRN  ondansetron Injectable 4 milliGRAM(s) IV Push every 6 hours PRN  oxyCODONE    IR 10 milliGRAM(s) Oral every 4 hours PRN  oxyCODONE    IR 5 milliGRAM(s) Oral every 4 hours PRN      A/P :   s/p Left Quadriceps tendon repair POD # 3  -    Pain control  -    DVT ppx: Aspirin 81mg BID  -    Weight bearing status: WBAT LLE  -    Physical Therapy  -    Occupational Therapy  -    Discharge plan: rehab today pending approval

## 2023-09-01 NOTE — SOCIAL WORK PROGRESS NOTE - NSSWPROGRESSNOTE_GEN_ALL_CORE
DEONNA received auth from insurance for [facility].  Auth # [id number] from9/1-9/4 with review 9/5.  reviewer is Anisa Pa  fax .  DEONNA messaged halfway to let them know and discharge is set up for Saturday 9/2 at 11am.  Pt is aware and in agreement with plan.  He is aware that he will need to pay for ambulette for transport.  DEONNA sent task to weekend SW to follow up on in AM.  DEONNA received auth from insurance for [facility].  Auth # [id number] from9/1-9/4 with review 9/5.  reviewer is Anisa Pa  fax .  [facility] messaged [facility] to let [facility] know and discharge is set up for Saturday 9/2 at 11am.  Pt is aware and in agreement with plan.  He is aware that he will need to pay for ambulette for transport.  DEONNA sent task to weekend SW to follow up on in AM.  DEONNA received auth from insurance for [facility].  Auth # [id number] from9/1-9/4 with review 9/5.  reviewer is Anisa Pa  fax .  DEONNA messaged [facility] to let them know and discharge is set up for Saturday 9/2 at 11am.  Pt is aware and in agreement with plan.  He is aware that he will need to pay for ambulette for transport.  DEONNA sent task to weekend SW to follow up on in AM.

## 2023-09-01 NOTE — PROGRESS NOTE ADULT - ASSESSMENT
63 yo male, pmh of obesity, jere, presenting for left quadriceps repair  - dvt ppx, pain control as per ortho  - pt/ot  -  needs rehab given full leg cast preventing ambulation up stairs and in confined space  - continue cpap at night  -  benadryl prn for sleep  - no medical contraindication to DC when rehab is arranged 61 yo male, pmh of obesity, jere, presenting for left quadriceps repair  - dvt ppx, pain control as per ortho  - pt/ot  -  needs rehab given full leg cast preventing ambulation up stairs and in confined space  - continue cpap at night  -  benadryl prn for sleep  - no medical contraindication to DC when rehab is arranged

## 2023-09-01 NOTE — SOCIAL WORK PROGRESS NOTE - NSSWPROGRESSNOTE_GEN_ALL_CORE
SW received message this morning from OPAL Blackburn at insurance.  Pts case was denied because peer to peer was not completed yesterday.  As per insurance they tried to call 3 times with no answer and were unable to leave message.  SW spoke to ortho PA in rounds and they stated they never received call.  OPAL Blackburn informed SW that we could either to an expedited appeal or resubmit a new request for auth with updated information.  SW spoke to kenya Ocasio who states she called but was told expedited appeals are for "life and death" situations and she did not feel this warranted life or death.   SW requested a new authorization.  SW spoke to OPAL Blackburn later today and she has received requested and states it went to the medical director for review and that we should have an answer today.  SW to follow and updated [facility] as well.    SW received message this morning from OPAL Blackburn at insurance.  Pts case was denied because peer to peer was not completed yesterday.  As per insurance they tried to call 3 times with no answer and were unable to leave message.  SW spoke to ortho PA in rounds and they stated they never received call.  OPAL Blackburn informed SW that we could either to an expedited appeal or resubmit a new request for auth with updated information.  SW spoke to kenya Ocasio who states she called but was told expedited appeals are for "life and death" situations and she did not feel this warranted life or death.   SW requested a new authorization.  SW spoke to OPAL Blackburn later today and she has received requested and states it went to the medical director for review and that we should have an answer today.  SW to follow and updated [facility] as well.    SW received message this morning from OPAL Blackburn at insurance.  Pts case was denied because peer to peer was not completed yesterday.  As per insurance they tried to call 3 times with no answer and were unable to leave message.  SW spoke to ortho PA in rounds and they stated they never received call.  OPAL Blackburn informed SW that we could either to an expedited appeal or resubmit a new request for auth with updated information.  SW spoke to kenya Ocasio who states she called but was told expedited appeals are for "life and death" situations and she did not feel this warranted life or death.   SW requested a new authorization.  SW spoke to OPAL Blackburn later today and she has received requested and states it went to the medical director for review and that we should have an answer today.  SW to follow and updated [facility] as well.

## 2023-09-02 ENCOUNTER — TRANSCRIPTION ENCOUNTER (OUTPATIENT)
Age: 63
End: 2023-09-02

## 2023-09-02 VITALS
RESPIRATION RATE: 18 BRPM | HEART RATE: 78 BPM | SYSTOLIC BLOOD PRESSURE: 148 MMHG | DIASTOLIC BLOOD PRESSURE: 78 MMHG | OXYGEN SATURATION: 94 %

## 2023-09-02 PROCEDURE — 97161 PT EVAL LOW COMPLEX 20 MIN: CPT

## 2023-09-02 PROCEDURE — 36415 COLL VENOUS BLD VENIPUNCTURE: CPT

## 2023-09-02 PROCEDURE — 94660 CPAP INITIATION&MGMT: CPT

## 2023-09-02 PROCEDURE — 97530 THERAPEUTIC ACTIVITIES: CPT

## 2023-09-02 PROCEDURE — 94664 DEMO&/EVAL PT USE INHALER: CPT

## 2023-09-02 PROCEDURE — 97165 OT EVAL LOW COMPLEX 30 MIN: CPT

## 2023-09-02 PROCEDURE — 97535 SELF CARE MNGMENT TRAINING: CPT

## 2023-09-02 PROCEDURE — C1713: CPT

## 2023-09-02 PROCEDURE — 80048 BASIC METABOLIC PNL TOTAL CA: CPT

## 2023-09-02 PROCEDURE — 99232 SBSQ HOSP IP/OBS MODERATE 35: CPT

## 2023-09-02 PROCEDURE — 85027 COMPLETE CBC AUTOMATED: CPT

## 2023-09-02 PROCEDURE — 97116 GAIT TRAINING THERAPY: CPT

## 2023-09-02 RX ADMIN — Medication 81 MILLIGRAM(S): at 05:49

## 2023-09-02 RX ADMIN — Medication 1000 MILLIGRAM(S): at 05:50

## 2023-09-02 RX ADMIN — Medication 1000 MILLIGRAM(S): at 05:49

## 2023-09-02 NOTE — DISCHARGE NOTE NURSING/CASE MANAGEMENT/SOCIAL WORK - NSDCPEFALRISK_GEN_ALL_CORE
For information on Fall & Injury Prevention, visit: [email or web address]/[email or web address]-and-maintains-health-and-mobility OR  [email or web address]://www.[facility]/news/fall-prevention-tips-to-avoid-injury OR  https://www.cdc.gov/steadi/patient.html For information on Fall & Injury Prevention, visit: https://www.[facility]/news/fall-prevention-protects-and-maintains-health-and-mobility OR  https://www.[facility]/news/fall-prevention-tips-to-avoid-injury OR  https://www.cdc.gov/steadi/patient.html For information on Fall & Injury Prevention, visit: https://www.[facility]/news/fall-prevention-protects-and-maintains-health-and-mobility OR  https://www.[facility]/news/fall-prevention-tips-to-avoid-injury OR  https://www.cdc.gov/steadi/patient.html

## 2023-09-02 NOTE — DISCHARGE NOTE NURSING/CASE MANAGEMENT/SOCIAL WORK - PATIENT PORTAL LINK FT
You can access the FollowMyHealth Patient Portal offered by [facility] by registering at the following website: [email or web address]. By joining [employer]’s FollowMyHealth portal, you will also be able to view your health information using other applications (apps) compatible with our system. You can access the FollowMyHealth Patient Portal offered by [facility] by registering at the following website: [email or web address]. By joining [employer]’s FollowMyHealth portal, you will also be able to view your health information using other applications (apps) compatible with our system. You can access the FollowMyHealth Patient Portal offered by [facility] by registering at the following website: [email or web address]. By joining [employer]’s FollowMyHealth portal, you will also be able to view your health information using other applications (apps) compatible with our system.

## 2023-09-02 NOTE — SOCIAL WORK PROGRESS NOTE - NSSWPROGRESSNOTE_GEN_ALL_CORE
Pt is scheduled for dc to [facility] today at 11 am via ambulette. Extra wide w/c ordered for the pt. Auth # [id number] from 9/1-9/4. [phone]. Pt will be bringing his own cpap. Pt, Tx team and [facility] notified. Pt was given ambulette information.  [place] [facility] [place]. Extra wide w/c ordered for the pt. Auth # [id number] from 9/1-9/4. [phone]. Pt will be bringing his own cpap. Pt, Tx team and [facility] [place]. Pt was given ambulette information.  Pt is scheduled for dc to [facility] today at 11 am via ambulette. Extra wide w/c ordered for the pt. Auth # [id number] from 9/1-9/4. [phone]. Pt will be bringing his own cpap. Pt, Tx team and [facility] notified. Pt was given ambulette information.

## 2023-09-02 NOTE — PROGRESS NOTE ADULT - ASSESSMENT
63 yo male, pmh of obesity, jere, presenting for left quadriceps repair  - dvt ppx, pain control as per ortho  - pt/ot  -  needs rehab given full leg cast preventing ambulation up stairs and in confined space  - continue cpap at night  -  benadryl prn for sleep  - no medical contraindication to DC, for DC today.  61 yo male, pmh of obesity, jere, presenting for left quadriceps repair  - dvt ppx, pain control as per ortho  - pt/ot  -  needs rehab given full leg cast preventing ambulation up stairs and in confined space  - continue cpap at night  -  benadryl prn for sleep  - no medical contraindication to DC, for DC today.

## 2023-09-02 NOTE — PROGRESS NOTE ADULT - SUBJECTIVE AND OBJECTIVE BOX
POD#:  4  S/P: Left Quad Tendon Repair                  SUBJECTIVE: Patient seen and examined.  Ambulated with PT. Tolerating diet. Voiding. Plan is to go to rehab this morning.   Reported Pain Score = 1/10 and well controlled. Able to walk with PT  Denies: CP/SOB/N/V/Numbness/Tingling    OBJECTIVE:     Vital Signs Last 24 Hrs  T(C): 36.7 (02 Sep 2023 07:54), Max: 37 (01 Sep 2023 23:30)  T(F): 98.1 (02 Sep 2023 07:54), Max: 98.6 (01 Sep 2023 23:30)  HR: 78 (02 Sep 2023 10:54) (66 - 84)  BP: 148/78 (02 Sep 2023 10:54) (126/79 - 148/78)  RR: 18 (02 Sep 2023 10:54) (18 - 18)  SpO2: 94% (02 Sep 2023 10:54) (91% - 96%)    Parameters below as of 02 Sep 2023 10:54  Patient On (Oxygen Delivery Method): room air      Gen: AAOx3, NAD  Abd: soft, NT, ND  Left Leg          Cylinder Cast: clean/dry/intact  and in the proper position. Cast is well supported. No changes in sensation secondary to cast.   Bilateral LEs:         Sensation:  intact to light touch          Motor exam:  5/5 dorsiflexion/plantarflexion/EHL          2+ DP pulses          Right calf supple, NT, Left calf cannot be assessed secondary to cast. No acute pain reported with movement through ankle.                 MEDICATIONS:  Anticoagulation:  aspirin enteric coated 81 milliGRAM(s) Oral two times a day      Pain medications:   acetaminophen     Tablet .. 1000 milliGRAM(s) Oral every 8 hours  diphenhydrAMINE 50 milliGRAM(s) Oral at bedtime PRN  HYDROmorphone  Injectable 0.5 milliGRAM(s) IV Push every 3 hours PRN  oxyCODONE    IR 5 milliGRAM(s) Oral every 4 hours PRN  oxyCODONE    IR 10 milliGRAM(s) Oral every 4 hours PRN        A/P :62y Male  s/p Left Quad Tendon Repair POD # 4  -    Pain control: Acetaminophen, Celebrex, Oxycodone, Dilaudid   -    Elevation of operative extremity to help with pain and swelling as tolerated while in cast  -    DVT ppx:    Aspirin 81mg q 12 h     and ambulation as tolerated  -    Medicine co-management  -    Weight bearing status: WBAT with cast   -    Physical Therapy  -    Occupational Therapy  -    Discharge plan:     TEA today pending medical clearance, insurance authorization, bed availability.   POD#:  4  S/P: Left Quad Tendon Repair                  SUBJECTIVE: Patient seen and examined.  Ambulated with PT. Tolerating diet. Voiding. Plan is to go to rehab this morning.   Reported Pain Score = 1/10 and well controlled. Able to walk with PT  Denies: CP/SOB/N/V/Numbness/Tingling    OBJECTIVE:     Vital Signs Last 24 Hrs  T(C): 36.7 (02 Sep 2023 07:54), Max: 37 (01 Sep 2023 23:30)  T(F): 98.1 (02 Sep 2023 07:54), Max: 98.6 (01 Sep 2023 23:30)  HR: 78 (02 Sep 2023 10:54) (66 - 84)  BP: 148/78 (02 Sep 2023 10:54) (126/79 - 148/78)  RR: 18 (02 Sep 2023 10:54) (18 - 18)  SpO2: 94% (02 Sep 2023 10:54) (91% - 96%)    Parameters below as of 02 Sep 2023 10:54  Patient On (Oxygen Delivery Method): room air      Gen: AAOx3, NAD  Abd: soft, NT, ND  Left Leg          Cylinder Cast: clean/dry/intact  and in the proper position. Cast is well supported. No changes in sensation secondary to cast.   Bilateral LEs:         Sensation:  intact to light touch          Motor exam:  5/5 dorsiflexion/plantarflexion/EHL          2+ DP pulses          Right calf supple, NT, Left calf cannot be assessed secondary to cast. No acute pain reported with movement through ankle.                 MEDICATIONS:  Anticoagulation:  aspirin enteric coated 81 milliGRAM(s) Oral two times a day      Pain medications:   acetaminophen     Tablet .. 1000 milliGRAM(s) Oral every 8 hours  HYDROmorphone  Injectable 0.5 milliGRAM(s) IV Push every 3 hours PRN  oxyCODONE    IR 5 milliGRAM(s) Oral every 4 hours PRN  oxyCODONE    IR 10 milliGRAM(s) Oral every 4 hours PRN        A/P :62y Male  s/p Left Quad Tendon Repair POD # 4  -    Pain control: Acetaminophen, Oxycodone, Dilaudid   -    Elevation of operative extremity to help with pain and swelling as tolerated while in cast  -    DVT ppx:    Aspirin 81mg q 12 h     and ambulation as tolerated  -    Medicine co-management  -    Weight bearing status: WBAT with cast   -    Physical Therapy  -    Occupational Therapy  -    Discharge plan:     TEA today pending medical clearance, insurance authorization, bed availability.

## 2023-09-02 NOTE — PROGRESS NOTE ADULT - SUBJECTIVE AND OBJECTIVE BOX
Patient is a 62y old  Male who presents with a chief complaint of left quadricep repair (01 Sep 2023 12:19)      INTERVAL HPI/OVERNIGHT EVENTS:  Patient seen and examined, having some knee pain after ambulation, improved with meds, denies dizziness, ,nausea, vomiting    ROS reviewed and is otherwise negative        Vital Signs Last 24 Hrs  T(C): 36.7 (02 Sep 2023 07:54), Max: 37 (01 Sep 2023 23:30)  T(F): 98.1 (02 Sep 2023 07:54), Max: 98.6 (01 Sep 2023 23:30)  HR: 66 (02 Sep 2023 07:54) (66 - 84)  BP: 126/79 (02 Sep 2023 07:54) (126/79 - 145/83)  BP(mean): --  RR: 18 (02 Sep 2023 07:54) (18 - 18)  SpO2: 94% (02 Sep 2023 07:54) (91% - 96%)    Parameters below as of 02 Sep 2023 07:54  Patient On (Oxygen Delivery Method): BiPAP/CPAP        PHYSICAL EXAM:  GENERAL: NAD,  obese  HEAD:  Atraumatic, Normocephalic  EYES: EOMI, PERRLA  ENMT: Moist mucous membranes, Good dentition, No lesions   NECK: Supple, No JVD, Normal thyroid  NERVOUS SYSTEM:  Alert & Oriented X3, Good concentration; All 4 extremities mobile, no gross sensory deficits.   CHEST/LUNG: Clear to auscultation bilaterally; No rales, rhonchi, wheezing, or rubs  HEART: Regular rate and rhythm; No murmurs, rubs, or gallops  ABDOMEN: Soft, Nontender, Nondistended; Bowel sounds present  EXTREMITIES:  + Pulses, left leg in full cast  SKIN: No rashes or lesions    MEDICATIONS  (STANDING):  acetaminophen     Tablet .. 1000 milliGRAM(s) Oral every 8 hours  aspirin enteric coated 81 milliGRAM(s) Oral two times a day  chlorhexidine 2% Cloths 1 Application(s) Topical daily  lactated ringers. 1000 milliLiter(s) (125 mL/Hr) IV Continuous <Continuous>    MEDICATIONS  (PRN):  diphenhydrAMINE 50 milliGRAM(s) Oral at bedtime PRN Insomnia  HYDROmorphone  Injectable 0.5 milliGRAM(s) IV Push every 3 hours PRN breakthrough pain  magnesium hydroxide Suspension 30 milliLiter(s) Oral daily PRN Constipation  ondansetron Injectable 4 milliGRAM(s) IV Push every 6 hours PRN Nausea and/or Vomiting  oxyCODONE    IR 10 milliGRAM(s) Oral every 4 hours PRN Severe Pain (7 - 10)  oxyCODONE    IR 5 milliGRAM(s) Oral every 4 hours PRN Moderate Pain (4 - 6)      Allergies    No Known Allergies    Intolerances          LABS:              CAPILLARY BLOOD GLUCOSE          RADIOLOGY & ADDITIONAL TESTS:        Care Discussed with Consultants/Other Providers:    Advanced Directives: [ ] DNR  [ ] No feeding tube  [ ] MOLST in chart  [ ] MOLST completed today  [ ] Unknown

## 2023-09-14 ENCOUNTER — APPOINTMENT (OUTPATIENT)
Dept: ORTHOPEDIC SURGERY | Facility: CLINIC | Age: 63
End: 2023-09-14
Payer: COMMERCIAL

## 2023-09-14 PROCEDURE — 73560 X-RAY EXAM OF KNEE 1 OR 2: CPT | Mod: LT

## 2023-09-14 PROCEDURE — 99024 POSTOP FOLLOW-UP VISIT: CPT

## 2023-09-28 ENCOUNTER — APPOINTMENT (OUTPATIENT)
Dept: ORTHOPEDIC SURGERY | Facility: CLINIC | Age: 63
End: 2023-09-28
Payer: COMMERCIAL

## 2023-09-28 PROCEDURE — 99024 POSTOP FOLLOW-UP VISIT: CPT

## 2023-10-26 ENCOUNTER — APPOINTMENT (OUTPATIENT)
Dept: ORTHOPEDIC SURGERY | Facility: CLINIC | Age: 63
End: 2023-10-26
Payer: COMMERCIAL

## 2023-10-26 PROCEDURE — 99024 POSTOP FOLLOW-UP VISIT: CPT

## 2023-12-22 PROBLEM — Z86.69 PERSONAL HISTORY OF OTHER DISEASES OF THE NERVOUS SYSTEM AND SENSE ORGANS: Chronic | Status: ACTIVE | Noted: 2023-08-23

## 2023-12-22 PROBLEM — E66.01 MORBID (SEVERE) OBESITY DUE TO EXCESS CALORIES: Chronic | Status: ACTIVE | Noted: 2023-08-23

## 2024-01-25 ENCOUNTER — APPOINTMENT (OUTPATIENT)
Dept: ORTHOPEDIC SURGERY | Facility: CLINIC | Age: 64
End: 2024-01-25
Payer: COMMERCIAL

## 2024-01-25 DIAGNOSIS — Z98.890 OTHER SPECIFIED POSTPROCEDURAL STATES: ICD-10-CM

## 2024-01-25 DIAGNOSIS — S76.112A STRAIN OF LEFT QUADRICEPS MUSCLE, FASCIA AND TENDON, INITIAL ENCOUNTER: ICD-10-CM

## 2024-01-25 PROCEDURE — 99214 OFFICE O/P EST MOD 30 MIN: CPT

## 2024-01-25 NOTE — HISTORY OF PRESENT ILLNESS
[de-identified] : The patient is a 62 year old M who presents today complaining of left leg pain. Date of Injury/Onset:  8/11/23 Pain:    At Rest: 1/10  With Activity:    7/10  Mechanism of injury: Slipped down the stairs and felt a strain. Quality of symptoms: Aching Improves with: Aleve Worse with: Walking / Bending Prior treatment/ Imaging: [facility] date of injury/ Followed up with Dr. Doty/ MRI  Occupation:   Additional Information: [None]   The patient is a 62-year-old who presents today complaining of left knee pain after slip and fall downstairs on 8/11/2023. He was transported to [facility] and evalauated with xrays, CT scan and bracing.   9/14/23: SOLEDAD here today for LT knee. 2 weeks s/p left quadriceps tendon repair on 8/29/23. Doing well. Denies f/c/s  9/28/23: Patient here to follow up Left leg. 4 weeks s/p LT Quad tendon repair 8/29/23. Doing well. Denies f/c/s  10/26/23: Patient here to follow up Left leg. 8 weeks s/p LT Quad tendon repair 8/29/23. Doing well and ROM is improving with PT  1/25/24: Patient here for follow up of left leg. 5 months s/p LT quad tendon repair. Doing well and is doing HEP. ROM improving.   [FreeTextEntry1] : Left quad [de-identified] : Prolonged sitting  [de-identified] : At [facility].

## 2024-01-25 NOTE — PHYSICAL EXAM
[de-identified] : No erythema, no discharge, no increased warmth to touch. ROM 0-60, strength testing deferred due to post-op status. Distally neurovascularly intact with median, radial, ulnar, MSC, axillary nerves intact. 2+ radial pulse with capillary refill >2 seconds

## (undated) DEVICE — SUT POLYSORB 0 30" GS-10 UNDYED

## (undated) DEVICE — SUT VLOC 180 3-0 18" P-12 UNDYED

## (undated) DEVICE — PACK LOWER EXTREMITY

## (undated) DEVICE — DRSG STOCKINETTE TUBULAR COTTON 2PLY 6X72"

## (undated) DEVICE — DRAPE 3/4 SHEET 52X76"

## (undated) DEVICE — GLV 7.5 PROTEXIS (WHITE)

## (undated) DEVICE — SOL IRR POUR H2O 1500ML

## (undated) DEVICE — DRSG SILVERLON ISLAND 4X6" 2X4" PAD

## (undated) DEVICE — ELCTR STRYKER NEPTUNE SMOKE EVACUATION PENCIL (GREEN)

## (undated) DEVICE — SUT MONOSOF 3-0 30" C-13

## (undated) DEVICE — WARMING BLANKET UPPER ADULT

## (undated) DEVICE — POSITIONER STRAP ARMBOARD VELCRO TS-30

## (undated) DEVICE — SUT ORTHOCORD 2 36" MO-6

## (undated) DEVICE — DRSG DERMABOND PRINEO 60CM

## (undated) DEVICE — PREP CHLORAPREP HI-LITE ORANGE 26ML

## (undated) DEVICE — SUT TAPE 1.3MM WH/BL 36.6MM RV CT NDL

## (undated) DEVICE — POSITIONER PATIENT SAFETY STRAP 3X60"

## (undated) DEVICE — DRAPE TOWEL BLUE 17" X 24"

## (undated) DEVICE — DRSG WEBRIL 6"

## (undated) DEVICE — SUT POLYSORB 1 27" GS-12 UNDYED

## (undated) DEVICE — SUT POLYSORB 0 30" GS-12 UNDYED

## (undated) DEVICE — GLV 8.5 PROTEXIS (BLUE)

## (undated) DEVICE — SUCTION YANKAUER NO CONTROL VENT

## (undated) DEVICE — DRAPE IOBAN 23" X 23"

## (undated) DEVICE — SOL IRR POUR NS 0.9% 500ML

## (undated) DEVICE — SUT NDL REGULAR SURGEON 1/2 CIRCLE REVERSE CUTTING 0.037" X 1.521"

## (undated) DEVICE — DRAPE U 47X51" LF STERILE

## (undated) DEVICE — SUT POLYSORB 2-0 30" V-20 UNDYED

## (undated) DEVICE — ARTHREX KIT FOR 2.6 FIBERTAK ANCHORS

## (undated) DEVICE — TOURNIQUET ESMARK 6"

## (undated) DEVICE — SUT HEWSON RETRIEVER

## (undated) DEVICE — SUT POLYSORB 2-0 30" GS-10 UNDYED

## (undated) DEVICE — LAP PAD 18 X 18"